# Patient Record
Sex: FEMALE | Race: WHITE | Employment: OTHER | URBAN - METROPOLITAN AREA
[De-identification: names, ages, dates, MRNs, and addresses within clinical notes are randomized per-mention and may not be internally consistent; named-entity substitution may affect disease eponyms.]

---

## 2022-03-10 ENCOUNTER — HOSPITAL ENCOUNTER (EMERGENCY)
Age: 87
Discharge: HOME | End: 2022-03-10
Attending: STUDENT IN AN ORGANIZED HEALTH CARE EDUCATION/TRAINING PROGRAM
Payer: MEDICARE

## 2022-03-10 VITALS
HEART RATE: 74 BPM | TEMPERATURE: 98.2 F | SYSTOLIC BLOOD PRESSURE: 142 MMHG | DIASTOLIC BLOOD PRESSURE: 86 MMHG | RESPIRATION RATE: 16 BRPM | OXYGEN SATURATION: 98 % | WEIGHT: 143.96 LBS

## 2022-03-10 DIAGNOSIS — R50.9 FEVER, UNSPECIFIED FEVER CAUSE: ICD-10-CM

## 2022-03-10 DIAGNOSIS — R35.0 URINARY FREQUENCY: Primary | ICD-10-CM

## 2022-03-10 LAB
APPEARANCE UR: CLEAR
BACTERIA URNS QL MICRO: NEGATIVE /HPF
BILIRUB UR QL: NEGATIVE
COLOR UR: ABNORMAL
EPITH CASTS URNS QL MICRO: ABNORMAL /LPF
GLUCOSE UR STRIP.AUTO-MCNC: NEGATIVE MG/DL
HGB UR QL STRIP: ABNORMAL
KETONES UR QL STRIP.AUTO: NEGATIVE MG/DL
LEUKOCYTE ESTERASE UR QL STRIP.AUTO: NEGATIVE
NITRITE UR QL STRIP.AUTO: NEGATIVE
PH UR STRIP: 6 [PH] (ref 5–8)
PROT UR STRIP-MCNC: NEGATIVE MG/DL
RBC #/AREA URNS HPF: ABNORMAL /HPF (ref 0–5)
SP GR UR REFRACTOMETRY: 1.02 (ref 1–1.03)
UR CULT HOLD, URHOLD: NORMAL
UROBILINOGEN UR QL STRIP.AUTO: 0.2 EU/DL (ref 0.2–1)
WBC URNS QL MICRO: ABNORMAL /HPF (ref 0–4)

## 2022-03-10 PROCEDURE — 81001 URINALYSIS AUTO W/SCOPE: CPT

## 2022-03-10 PROCEDURE — 99283 EMERGENCY DEPT VISIT LOW MDM: CPT

## 2022-03-10 PROCEDURE — 87086 URINE CULTURE/COLONY COUNT: CPT

## 2022-03-10 NOTE — DISCHARGE INSTRUCTIONS
Return if fever persists for more than 48 hours, trouble breathing, abdominal pain, or other concerning symptoms

## 2022-03-10 NOTE — ED PROVIDER NOTES
The history is provided by the patient and a relative. Urinary Frequency   This is a new problem. The current episode started 6 to 12 hours ago. The problem occurs every urination. The problem has not changed since onset. The quality of the pain is described as burning. The pain is mild. There has been a fever of 100 - 100.9 F. The fever has been present for less than 1 day. She is not sexually active. Associated symptoms include frequency and urgency. Pertinent negatives include no chills, no sweats, no nausea, no vomiting, no flank pain, no abdominal pain and no back pain. She has tried nothing for the symptoms. The treatment provided no relief. Her past medical history is significant for recurrent UTIs. No past medical history on file. No past surgical history on file. No family history on file. Social History     Socioeconomic History    Marital status: Not on file     Spouse name: Not on file    Number of children: Not on file    Years of education: Not on file    Highest education level: Not on file   Occupational History    Not on file   Tobacco Use    Smoking status: Not on file    Smokeless tobacco: Not on file   Substance and Sexual Activity    Alcohol use: Not on file    Drug use: Not on file    Sexual activity: Not on file   Other Topics Concern    Not on file   Social History Narrative    Not on file     Social Determinants of Health     Financial Resource Strain:     Difficulty of Paying Living Expenses: Not on file   Food Insecurity:     Worried About Running Out of Food in the Last Year: Not on file    Leilani of Food in the Last Year: Not on file   Transportation Needs:     Lack of Transportation (Medical): Not on file    Lack of Transportation (Non-Medical):  Not on file   Physical Activity:     Days of Exercise per Week: Not on file    Minutes of Exercise per Session: Not on file   Stress:     Feeling of Stress : Not on file   Social Connections:     Frequency of Communication with Friends and Family: Not on file    Frequency of Social Gatherings with Friends and Family: Not on file    Attends [employer] Services: Not on file    Active Member of Clubs or Organizations: Not on file    Attends Club or Organization Meetings: Not on file    Marital Status: Not on file   Intimate Partner Violence:     Fear of Current or Ex-Partner: Not on file    Emotionally Abused: Not on file    Physically Abused: Not on file    Sexually Abused: Not on file   Housing Stability:     Unable to Pay for Housing in the Last Year: Not on file    Number of [place] in the Last Year: Not on file    Unstable Housing in the Last Year: Not on file         ALLERGIES: Amoxicillin, Augmentin [amoxicillin-pot clavulanate], Avelox [moxifloxacin], Celebrex [celecoxib], Ciprofloxacin, Clindamycin, Penicillin g, Phenergan [promethazine], Pyridium [phenazopyridine], Sulfa (sulfonamide antibiotics), and Tramadol    Review of Systems   Constitutional: Positive for fever (at home, took Aleve this morning). Negative for chills. HENT: Negative for sore throat. Respiratory: Negative for cough. Gastrointestinal: Negative for abdominal pain, nausea and vomiting. Genitourinary: Positive for frequency and urgency. Negative for flank pain. See HPI     Musculoskeletal: Negative for back pain, neck pain and neck stiffness. Skin: Negative for color change. Neurological: Negative for headaches. All other systems reviewed and are negative. Vitals:    03/10/22 1431   BP: (!) 163/104   Pulse: 66   Resp: (!) 200   Temp: 98.2 °F (36.8 °C)   SpO2: 96%   Weight: 65.3 kg (143 lb 15.4 oz)            Physical Exam  Vitals and nursing note reviewed. Constitutional:       General: She is not in acute distress. Appearance: She is well-developed. HENT:      Head: Normocephalic and atraumatic.    Eyes:      Conjunctiva/sclera: Conjunctivae normal.   Cardiovascular:      Rate and Rhythm: Normal rate and regular rhythm. Pulmonary:      Effort: Pulmonary effort is normal. No respiratory distress. Abdominal:      Palpations: Abdomen is soft. Tenderness: There is no abdominal tenderness. There is no right CVA tenderness, left CVA tenderness or guarding. Musculoskeletal:         General: No deformity or signs of injury. Normal range of motion. Cervical back: Normal range of motion and neck supple. No rigidity. Skin:     General: Skin is warm and dry. Findings: No erythema. Neurological:      Mental Status: She is alert and oriented to person, place, and time. Motor: No abnormal muscle tone. MDM       Procedures    A/P: This is a 62-year-old female who recently moved here from [place] 2 weeks ago, attempting to establish a primary care physician and had an appointment scheduled for today, however, had a low-grade temperature at home therefore the plan to be canceled. She was told to come to the ED for further evaluation. Only complaint now is urinary frequency and dysuria. She is afebrile here and looks clinically well. No SIRS criteria. No other focal symptoms to suggest pneumonia, URI, meningitis, cellulitis, intra-abdominal surgical emergency. Plan to check UA to screen for UTI, will likely need empiric antibiotic treatment. 3:00 PM  Change of shift. Care of patient signed over to Dr. Radha Saleh. Handoff complete.

## 2022-03-10 NOTE — ED NOTES
3PM  Change of shift. Care of patient taken over from Dr Maryse Barahona; H&P reviewed, bedside handoff complete. Awaiting urinalysis    UA shows  Small amount of blood but no other signs of infection. Will send cultures and she is symptomatic with fever. At this time I do not feel that she needs treatment. She has no other localizing symptoms that would warrant testing. She is afebrile here with stable vital signs. I offered Covid test which she declined at this time. ICD-10-CM ICD-9-CM    1. Urinary frequency  R35.0 788.41    2.  Fever, unspecified fever cause  R50.9 780.60      DC  Dallas Hamilton, DO

## 2022-03-10 NOTE — ED NOTES
Patient given discharge papers and instructions by primary RN. Patient verbalized understanding and stated not having questions or concerns regarding her care. Patient ambulatory out of ED in no new acute distress.

## 2022-03-10 NOTE — ED TRIAGE NOTES
Patient arrives with c/o fever that started this morning at a PCP appointment she had and frequent urination. Patient was at PCP today and they wouldn't see her due to fever.

## 2022-03-11 LAB
BACTERIA SPEC CULT: NORMAL
SERVICE CMNT-IMP: NORMAL

## 2022-08-21 ENCOUNTER — HOSPITAL ENCOUNTER (EMERGENCY)
Age: 87
Discharge: HOME | End: 2022-08-21
Attending: EMERGENCY MEDICINE | Admitting: EMERGENCY MEDICINE
Payer: MEDICARE

## 2022-08-21 VITALS
DIASTOLIC BLOOD PRESSURE: 75 MMHG | OXYGEN SATURATION: 99 % | SYSTOLIC BLOOD PRESSURE: 133 MMHG | TEMPERATURE: 97.3 F | HEART RATE: 63 BPM | WEIGHT: 153 LBS | RESPIRATION RATE: 16 BRPM

## 2022-08-21 DIAGNOSIS — R82.998 URINARY CRYSTALS: ICD-10-CM

## 2022-08-21 DIAGNOSIS — R60.0 PEDAL EDEMA: Primary | ICD-10-CM

## 2022-08-21 LAB
ALBUMIN SERPL-MCNC: 3.5 G/DL (ref 3.5–5)
ALBUMIN/GLOB SERPL: 0.8 {RATIO} (ref 1.1–2.2)
ALP SERPL-CCNC: 119 U/L (ref 45–117)
ALT SERPL-CCNC: 33 U/L (ref 12–78)
ANION GAP SERPL CALC-SCNC: 8 MMOL/L (ref 5–15)
APPEARANCE UR: CLEAR
AST SERPL-CCNC: 34 U/L (ref 15–37)
BACTERIA URNS QL MICRO: NEGATIVE /HPF
BASOPHILS # BLD: 0 K/UL (ref 0–0.1)
BASOPHILS NFR BLD: 1 % (ref 0–1)
BILIRUB SERPL-MCNC: 0.5 MG/DL (ref 0.2–1)
BILIRUB UR QL: NEGATIVE
BUN SERPL-MCNC: 11 MG/DL (ref 6–20)
BUN/CREAT SERPL: 20 (ref 12–20)
CALCIUM SERPL-MCNC: 9 MG/DL (ref 8.5–10.1)
CHLORIDE SERPL-SCNC: 99 MMOL/L (ref 97–108)
CO2 SERPL-SCNC: 27 MMOL/L (ref 21–32)
COLOR UR: ABNORMAL
CREAT SERPL-MCNC: 0.56 MG/DL (ref 0.55–1.02)
DIFFERENTIAL METHOD BLD: NORMAL
EOSINOPHIL # BLD: 0.1 K/UL (ref 0–0.4)
EOSINOPHIL NFR BLD: 2 % (ref 0–7)
EPITH CASTS URNS QL MICRO: ABNORMAL /LPF
ERYTHROCYTE [DISTWIDTH] IN BLOOD BY AUTOMATED COUNT: 14 % (ref 11.5–14.5)
GLOBULIN SER CALC-MCNC: 4.3 G/DL (ref 2–4)
GLUCOSE SERPL-MCNC: 84 MG/DL (ref 65–100)
GLUCOSE UR STRIP.AUTO-MCNC: NEGATIVE MG/DL
HCT VFR BLD AUTO: 38.5 % (ref 35–47)
HGB BLD-MCNC: 12.5 G/DL (ref 11.5–16)
HGB UR QL STRIP: NEGATIVE
IMM GRANULOCYTES # BLD AUTO: 0 K/UL (ref 0–0.04)
IMM GRANULOCYTES NFR BLD AUTO: 0 % (ref 0–0.5)
KETONES UR QL STRIP.AUTO: NEGATIVE MG/DL
LEUKOCYTE ESTERASE UR QL STRIP.AUTO: ABNORMAL
LYMPHOCYTES # BLD: 1.7 K/UL (ref 0.8–3.5)
LYMPHOCYTES NFR BLD: 29 % (ref 12–49)
MCH RBC QN AUTO: 28.3 PG (ref 26–34)
MCHC RBC AUTO-ENTMCNC: 32.5 G/DL (ref 30–36.5)
MCV RBC AUTO: 87.3 FL (ref 80–99)
MONOCYTES # BLD: 0.4 K/UL (ref 0–1)
MONOCYTES NFR BLD: 7 % (ref 5–13)
NEUTS SEG # BLD: 3.6 K/UL (ref 1.8–8)
NEUTS SEG NFR BLD: 61 % (ref 32–75)
NITRITE UR QL STRIP.AUTO: NEGATIVE
NRBC # BLD: 0 K/UL (ref 0–0.01)
NRBC BLD-RTO: 0 PER 100 WBC
PH UR STRIP: 7 [PH] (ref 5–8)
PLATELET # BLD AUTO: 248 K/UL (ref 150–400)
PMV BLD AUTO: 9 FL (ref 8.9–12.9)
POTASSIUM SERPL-SCNC: 4.1 MMOL/L (ref 3.5–5.1)
PROT SERPL-MCNC: 7.8 G/DL (ref 6.4–8.2)
PROT UR STRIP-MCNC: NEGATIVE MG/DL
RBC # BLD AUTO: 4.41 M/UL (ref 3.8–5.2)
RBC #/AREA URNS HPF: ABNORMAL /HPF (ref 0–5)
SODIUM SERPL-SCNC: 134 MMOL/L (ref 136–145)
SP GR UR REFRACTOMETRY: 1.01 (ref 1–1.03)
TRI-PHOS CRY URNS QL MICRO: ABNORMAL
UR CULT HOLD, URHOLD: NORMAL
UROBILINOGEN UR QL STRIP.AUTO: 0.2 EU/DL (ref 0.2–1)
WBC # BLD AUTO: 5.9 K/UL (ref 3.6–11)
WBC URNS QL MICRO: ABNORMAL /HPF (ref 0–4)

## 2022-08-21 PROCEDURE — 85025 COMPLETE CBC W/AUTO DIFF WBC: CPT

## 2022-08-21 PROCEDURE — 99283 EMERGENCY DEPT VISIT LOW MDM: CPT | Performed by: EMERGENCY MEDICINE

## 2022-08-21 PROCEDURE — 81001 URINALYSIS AUTO W/SCOPE: CPT

## 2022-08-21 PROCEDURE — 80053 COMPREHEN METABOLIC PANEL: CPT

## 2022-08-21 PROCEDURE — 36415 COLL VENOUS BLD VENIPUNCTURE: CPT

## 2022-08-21 RX ORDER — FUROSEMIDE 20 MG/1
20 TABLET ORAL DAILY
Qty: 7 TABLET | Refills: 0 | Status: SHIPPED | OUTPATIENT
Start: 2022-08-21

## 2022-08-21 NOTE — ED PROVIDER NOTES
79-year-old female with history of breast cancer, colostomy presents to the emergency department chief complaint of urinary frequency with odor. This has been worsening over the past couple weeks. No dysuria. This is her typical presentation for urinary tract infection. She came this morning to the ER because she has some swelling in her bilateral feet and ankles which is new. Her feet and ankles are not painful. No new back pain. The history is provided by the patient and medical records. Urinary Odor   This is a new problem. The current episode started more than 1 week ago. The problem has not changed since onset. There has been no fever. Associated symptoms include frequency. Pertinent negatives include no nausea, no vomiting and no abdominal pain. Urinary Frequency   Associated symptoms include frequency. Pertinent negatives include no nausea, no vomiting and no abdominal pain. Past Medical History:   Diagnosis Date    Breast cancer ([address]     Colostomy present ([facility] [address]     Irregular heart beat 2020    watchman       Past Surgical History:   Procedure Laterality Date    HX BREAST LUMPECTOMY Right     HX ORTHOPAEDIC           History reviewed. No pertinent family history.     Social History     Socioeconomic History    Marital status:      Spouse name: Not on file    Number of children: Not on file    Years of education: Not on file    Highest education level: Not on file   Occupational History    Not on file   Tobacco Use    Smoking status: Never    Smokeless tobacco: Never   Substance and Sexual Activity    Alcohol use: Never    Drug use: Never    Sexual activity: Not on file   Other Topics Concern    Not on file   Social History Narrative    Not on file     Social Determinants of Health     Financial Resource Strain: Not on file   Food Insecurity: Not on file   Transportation Needs: Not on file   Physical Activity: Not on file   Stress: Not on file   Social Connections: Not on file Intimate Partner Violence: Not on file   Housing Stability: Not on file         ALLERGIES: Amoxicillin, Augmentin [amoxicillin-pot clavulanate], Avelox [moxifloxacin], Celebrex [celecoxib], Ciprofloxacin, Clindamycin, Penicillin g, Phenergan [promethazine], Pyridium [phenazopyridine], Sulfa (sulfonamide antibiotics), and Tramadol    Review of Systems   Constitutional:  Negative for fatigue and fever. HENT:  Negative for sneezing and sore throat. Respiratory:  Negative for cough and shortness of breath. Cardiovascular:  Positive for leg swelling. Negative for chest pain. Gastrointestinal:  Negative for abdominal pain, diarrhea, nausea and vomiting. Genitourinary:  Positive for frequency. Negative for difficulty urinating and dysuria. Musculoskeletal:  Negative for arthralgias and myalgias. Skin:  Negative for color change and rash. Neurological:  Negative for weakness and headaches. Psychiatric/Behavioral:  Negative for agitation and behavioral problems. Vitals:    08/21/22 1126   BP: 133/75   Pulse: 63   Resp: 16   Temp: 97.3 °F (36.3 °C)   SpO2: 99%   Weight: 69.4 kg (153 lb)            Physical Exam  Vitals and nursing note reviewed. Constitutional:       General: She is not in acute distress. Appearance: Normal appearance. She is well-developed. She is not ill-appearing, toxic-appearing or diaphoretic. HENT:      Head: Normocephalic and atraumatic. Nose: Nose normal.      Mouth/Throat:      Mouth: Mucous membranes are moist.      Pharynx: Oropharynx is clear. Eyes:      Extraocular Movements: Extraocular movements intact. Conjunctiva/sclera: Conjunctivae normal.      Pupils: Pupils are equal, round, and reactive to light. Cardiovascular:      Rate and Rhythm: Normal rate and regular rhythm. Pulses: Normal pulses. Heart sounds: Normal heart sounds. Pulmonary:      Effort: Pulmonary effort is normal. No respiratory distress.       Breath sounds: Normal breath sounds. No wheezing. Chest:      Chest wall: No tenderness. Abdominal:      General: Abdomen is flat. There is no distension. Palpations: Abdomen is soft. Tenderness: There is no abdominal tenderness. There is no guarding or rebound. Musculoskeletal:         General: No swelling, tenderness, deformity or signs of injury. Normal range of motion. Cervical back: Normal range of motion and neck supple. No rigidity. No muscular tenderness. Right lower leg: Edema present. Left lower leg: Edema present. Skin:     General: Skin is warm and dry. Capillary Refill: Capillary refill takes less than 2 seconds. Neurological:      General: No focal deficit present. Mental Status: She is alert and oriented to person, place, and time. Psychiatric:         Mood and Affect: Mood normal.         Behavior: Behavior normal.        MDM  Number of Diagnoses or Management Options  Diagnosis management comments: 56-year-old female presents as above with bilateral ankle and foot swelling along with a foul smell to her urine. Her urinalysis does not demonstrate evidence of infection but does show some crystals. She also had heavy salt intake yesterday which may account for swelling. Plan to give short course of diuretic with instructions to follow-up with [place] [facility], return if needed.        Amount and/or Complexity of Data Reviewed  Clinical lab tests: reviewed           Procedures

## 2023-02-21 ENCOUNTER — HOME HEALTH ADMISSION (OUTPATIENT)
Dept: HOME HEALTH SERVICES | Facility: HOME HEALTH | Age: 88
End: 2023-02-21
Payer: MEDICARE

## 2023-02-24 ENCOUNTER — HOME CARE VISIT (OUTPATIENT)
Dept: SCHEDULING | Facility: HOME HEALTH | Age: 88
End: 2023-02-24
Payer: MEDICARE

## 2023-02-24 VITALS
RESPIRATION RATE: 18 BRPM | HEART RATE: 66 BPM | OXYGEN SATURATION: 97 % | TEMPERATURE: 97.3 F | DIASTOLIC BLOOD PRESSURE: 70 MMHG | SYSTOLIC BLOOD PRESSURE: 125 MMHG

## 2023-02-24 PROCEDURE — G0151 HHCP-SERV OF PT,EA 15 MIN: HCPCS

## 2023-02-24 PROCEDURE — 400018 HH-NO PAY CLAIM PROCEDURE

## 2023-02-24 PROCEDURE — G0299 HHS/HOSPICE OF RN EA 15 MIN: HCPCS

## 2023-02-24 NOTE — HOME HEALTH
Skilled reason for admission/summary of clinical condition: CVA right weakness and aphasia admitted to home care for SN. Nursing needed for medication management, cp, neuro teaching and management, pain management. Diagnosis: cva right weakness and aphasia  Subjective: patient reports knee and hand pain  Caregiver: other. Caregiver assists with: ADL and Housekeeping Caregiver unable to assist with: Medications, Meals, Bathing, IADL, Wound care and Transportation. Caregiver is available 4 hours/day, 2 days/week Caregiver is present at this visit and did not participate with clinician. Medications reconciled and all medications are available in the home this visit. The following education was provided regarding medications: medication interactions and look alike medications: High alert medication teaching on eliquis, anticoagulant therapy education; purpose, dose, and frequency and signs and symptoms of abnormal bleeding such as unexplained bruising, dizziness/lightheadedness, red or tarry looking stool, blood in urine, blood in vomit High alert medication teaching on valcyclovir, Antibiotic therapy education Purpose, dose, and frequency and Side effects such as nausea, vomiting, diarrhea, fungal (yeast) vaginal infections or oral thrush. Patient/CG able to demonstrate knowledge through teach back with 75 percent accuracy. Medications are effective at this time. MD notified of any discrepancies/medication interactions patient reports was on a higher dose of neurontin prior to hosptial. A list of reconciled medications has been given to the patient/caregiver and a copy has been uploaded to media. Home health supplies by type and quantity ordered/delivered this visit include: zinc  Patient/caregiver instructed on plan of care and are agreeable to plan of care at this time.     Clinician reviewed orientation to home health booklet with patient/caregiver including agency phone number, agency complaint process, state hotline number, as well as joint commission's quality hotline number. Consent forms signed. Patient at risk for falls Yes:   Recommended requesting PT/OT orders due to fall risk YES:   Patient response to recommended requesting of PT/OT orders: agreeable    Discharge planning discussed with patient and caregiver. Discharge planning as follows: When goals are met Patient/caregiver did verbalize agreement with discharge planning. Clinical Assessment (What this means for the patient overall and need for ongoing skilled care):Patient will continue to benefit from skilled nursing services for medication management, cp, neuro teaching and management, pain management. Written Teaching Material Utilized: admission packet, med list    Specific plan for next visit: Educate on cva management    Plan of care and admission to home health status called to attending physician. The following practitioner has agreed to sign the ongoing Halina Walker MD, and was notified of the following: visit frequency of 1w9,3prn, requesting additional services as follows: pt/ot and medication concerns as follows: dose confirmation on neurontin    Interdisciplinary communication with:  Physician for the purpose of admission notification and reporting co eye sty to md    PCP: Nila Brown MD  Next scheduled doctor appointment: 4/10/23  Patient/Caregiver instructed to keep follow up appointment because lack of follow through with physician appointments could result in discontinuation of home care services for non-compliance. Patient/Caregiver verbalize knowledge of above through teach back with 100 percent accuracy. Emergency Preparedness: Patient/Caregiver instructed in the following:  Have one gallon of water per person for at least 3 days on hand. Have non-perishable food for at least 3 days that do not need to be cooked. Have flashlights and batteries.   Charge your cell phones and any back up lithium batteries for your cell phones. Have 3+ days of back up oxygen in your home. Have a phone in your home that is hard wired and does not require power. Have medication for a week in your home. Make sure you have a caregiver in the home to provide care in case your home health nurse cannot get to your house. Make sure you have all of your paperwork i.e. written emergency preparedness plan, Identification, insurance cards, DME phone number, physician and pharmacy phone number, agency phone number, and your medications in one place for easy access and in a zip lock bag to protect them. Take your Admission Handbook, written emergency preparedness plan, written medication list and folder if you relocate in the event of an emergency, if possible. Call agency if you relocate so we can contact you. Patient/Caregiver verbalize knowledge of above through teach back with 100 percent accuracy.

## 2023-02-25 NOTE — HOME HEALTH
Skilled reason for admission/summary of clinical condition: 83 yo female with CVA following Watchman repair 1/25/23; transferred to [facility] 2/02-2/23/23 then [facility] to home;  admitted to home care for SN/PT/OT; PMH significant for: a fib with ablation, HTN, HLD, chronic urinary incontinence, colon Ca, ostomy; right TKR; te TSR  Diagnosis: other sequelae of cerebral infarction  Subjective: pt states she wants to walk again; her cc is chronic left knee pain    Caregiver: son and paid cg during the day;. Caregiver assists with: IADLs, ADLs as needed;  Caregiver unable to assist with: n/a  Caregiver is available: MTWF 10-2; son available after work as needed; private cg Dannielle present today and did participate with PT    Medications reconciled and all medications are available in the home this visit. Meds reconciled by SN earlier today    Home health supplies by type and quantity ordered/delivered this visit include: n/a  Patient/caregiver instructed on plan of care and are agreeable to plan of care at this time. Clinician reviewed orientation to home health booklet with patient/caregiver including agency phone number, agency complaint process, state hotline number, as well as joint commission's quality hotline number. Computer signed. Patient at risk for falls  YES   Recommended requesting PT/OT orders due to fall risk  done   Patient response to recommended requesting of PT/OT orders:     Discharge planning discussed with patient and caregiver. Discharge planning as follows: pt to remain at home with assist of caregiver and guidance from MD; Patient/caregiver did verbalize agreement with discharge planning.      Clinical Assessment (What this means for the patient overall and need for ongoing skilled care): pt lives alone in small one bedroom second floor apt in senior apt house; her home is clean and free of clutter; there is an elevator to exit; she has a private paid cg MWF from 10-2 and states they will also add Tuesday; pt is confined to [facility] since CVA; she is ind in [facility] mobility within her apt; she reports she was \"working on walking in rehab\" with r. walker; today she was able to stand from [facility] and transfer ind to bed and toilet; she is ind in bed mobility; her cc is chronic left knee pain increased with wt bearing; her son has her walker and will bring it to her apt by next PT visit; she has a rollator but is unsafe to walk with it; PLOF the patient was ind in ambulation with rollator and ind in all ADLs; she has had the private cg for months to assist with IADLs; recommend PT 1w1,2w4 for ther ex, HEP and gait training; rehab potential is fair to achieve goals    Written Teaching Material Utilized: fall prevention strategies in admission handbook    Specific plan for next visit: initiate gait training with r. walker if equipment available    Plan of care and admission to home health status called to attending physician. The following practitioner has agreed to sign the ongoing POC Dr.Kathryn Lindo, and was notified of the following: PT: 1w1,2w4    Interdisciplinary communication with: note sent to admit SN re: wound    PCP: Glenn Contreras  Next scheduled doctor appointment: TBD  Patient/Caregiver instructed to keep follow up appointment because lack of follow through with physician appointments could result in discontinuation of home care services for non-compliance. Patient/Caregiver verbalize knowledge of above through teach back with 100 percent accuracy. Emergency Preparedness: Patient/Caregiver instructed in the following:  Have one gallon of water per person for at least 3 days on hand. Have non-perishable food for at least 3 days that do not need to be cooked. Have flashlights and batteries. Charge your cell phones and any back up lithium batteries for your cell phones. Have 3+ days of back up oxygen in your home. Have a phone in your home that is hard wired and does not require power.   Have medication for a week in your home. Make sure you have a caregiver in the home to provide care in case your home health nurse cannot get to your house. Make sure you have all of your paperwork i.e. written emergency preparedness plan, Identification, insurance cards, DME phone number, physician and pharmacy phone number, agency phone number, and your medications in one place for easy access and in a zip lock bag to protect them. Take your Admission Handbook, written emergency preparedness plan, written medication list and folder if you relocate in the event of an emergency, if possible. Call agency if you relocate so we can contact you. Patient/Caregiver verbalize knowledge of above through teach back with 50 percent accuracy.

## 2023-02-26 VITALS
TEMPERATURE: 97.9 F | RESPIRATION RATE: 16 BRPM | HEART RATE: 67 BPM | DIASTOLIC BLOOD PRESSURE: 72 MMHG | OXYGEN SATURATION: 100 % | SYSTOLIC BLOOD PRESSURE: 138 MMHG

## 2023-02-27 ENCOUNTER — HOME CARE VISIT (OUTPATIENT)
Dept: SCHEDULING | Facility: HOME HEALTH | Age: 88
End: 2023-02-27
Payer: MEDICARE

## 2023-02-27 PROCEDURE — G0152 HHCP-SERV OF OT,EA 15 MIN: HCPCS

## 2023-02-28 ENCOUNTER — HOME CARE VISIT (OUTPATIENT)
Dept: SCHEDULING | Facility: HOME HEALTH | Age: 88
End: 2023-02-28
Payer: MEDICARE

## 2023-02-28 VITALS
SYSTOLIC BLOOD PRESSURE: 100 MMHG | TEMPERATURE: 98.8 F | HEART RATE: 67 BPM | DIASTOLIC BLOOD PRESSURE: 60 MMHG | RESPIRATION RATE: 16 BRPM | OXYGEN SATURATION: 96 %

## 2023-02-28 VITALS
HEART RATE: 61 BPM | OXYGEN SATURATION: 97 % | RESPIRATION RATE: 16 BRPM | SYSTOLIC BLOOD PRESSURE: 130 MMHG | DIASTOLIC BLOOD PRESSURE: 70 MMHG | TEMPERATURE: 97.9 F

## 2023-02-28 PROCEDURE — G0151 HHCP-SERV OF PT,EA 15 MIN: HCPCS

## 2023-02-28 NOTE — HOME HEALTH
Skilled reason for admission/summary of clinical condition: 81 yo female s/p CVA following [employer] repair 1/25/23; transferred to [place] [address] 2/02-2/23/23 then [facility] to home;  admitted to home care for SN/PT/OT; PMH significant for: a fib with ablation, HTN, HLD, chronic urinary incontinence, colon Ca, ostomy; right TKR; te TSR. PLOF: lives in a [address] bulding. Had caregiver 5 days a week assisting with ADLs and IADLS. was walking short distances. Diagnosis: other sequelae of cerebral infarction    Subjective: pt states she wants to walk again; her cc is chronic left knee pain    Caregiver: son and paid cg during the day;. Caregiver assists with: IADLs, ADLs as needed;  Caregiver unable to assist with: n/a  Caregiver is available: [facility] 10-2; son available after work as needed; private cg Kathryn Stein present today and did participate with PT      Patient/caregiver instructed on plan of care and are agreeable to plan of care at this time. Patient at risk for falls  YES   Recommended requesting PT/OT orders due to fall risk  done   Patient response to recommended requesting of PT/OT orders: agreeable    Discharge planning discussed with patient and caregiver. Discharge planning as follows: pt to remain at home with assist of caregiver and guidance from MD; Patient/caregiver did verbalize agreement with discharge planning. Clinical Assessment (What this means for the patient overall and need for ongoing skilled care): pt lives alone in small one bedroom second floor apt in senior apt. Pt currenly using WC for all mobility in the home. Pt is I with WC mobility. Able to complete stand pivot transer to commode with CGA.   patient presents at high risk of falls (MAHC-10 = 9) and mod A for safe ADL completion (Modified Barthel Index = 50/100) during OT Eval. She is currently unsafe with transfesr to shower due to impaired balance and safety and unable to manage meal prep due to BUE weakness and slightly impaired ROM in the RUE. Skilled OT services are necessary to reduce patients fall risk and assist him in safely returning to his PLOF. Specific plan for next visit: initiate gait training with alfonzo walker if equipment availabilty. Plan of care and admission to home health status called to attending physician. The following practitioner has agreed to sign the ongoing POC Dr.Kathryn Lindo, and was notified of the following: OT 2w4    PCP: Eduardo Douglass    Next scheduled doctor appointment: TBD  Patient/Caregiver instructed to keep follow up appointment because lack of follow through with physician appointments could result in discontinuation of home care services for non-compliance. Patient/Caregiver verbalize knowledge of above through teach back with 100 percent accuracy. .  Modified Barthel:   Feeding  10  Bathing 0   Grooming 5  Dressing 0 5   Bowels 10  Bladder 0 5   Toilet Use 0 5   Transfer 0 5   Mobility 0   Stairs 0 5  50/100 mod A needed for ADLs.

## 2023-03-01 ENCOUNTER — HOME CARE VISIT (OUTPATIENT)
Dept: SCHEDULING | Facility: HOME HEALTH | Age: 88
End: 2023-03-01
Payer: MEDICARE

## 2023-03-01 NOTE — HOME HEALTH
Subjective: Patient states her groin area hurts and called out to urologist  Falls since last visit NO(if yes complete the Fall Tracking Form and include bsrifallreport):  Caregiver involvement changes: None  Home health supplies by type and quantity ordered/delivered this visit include: None    Clinician asked if patient has had any physician contact since last home care visit and patient states: NO  Clinician asked if patient has any new or changed medications and patient states:  NO   If Yes, were medications reconciled? N/A   Was the certifying physician notified of changes in medications? N/A     Clinical assessment (what this visit means for the patient overall and need for ongoing skilled care) and progress or lack of progress towards SPECIFIC goals: Patient ambulated with assistance today and demonstrates decr balance. Gait goal not met. Patient demonstrates limited carryover with education for safe transfers. Fall risk goal not met. Written Teaching Material Utilized: calendar of appointments    Interdisciplinary communication with: N/A     Discharge planning as follows: Will discharge when the patient has reached their maximum functional potential and maximum safety in their home    Specific plan for next visit: continue with gait and balance.

## 2023-03-02 ENCOUNTER — HOME CARE VISIT (OUTPATIENT)
Dept: SCHEDULING | Facility: HOME HEALTH | Age: 88
End: 2023-03-02
Payer: MEDICARE

## 2023-03-02 PROCEDURE — G0151 HHCP-SERV OF PT,EA 15 MIN: HCPCS

## 2023-03-03 ENCOUNTER — HOME CARE VISIT (OUTPATIENT)
Dept: SCHEDULING | Facility: HOME HEALTH | Age: 88
End: 2023-03-03
Payer: MEDICARE

## 2023-03-03 VITALS
OXYGEN SATURATION: 97 % | RESPIRATION RATE: 16 BRPM | TEMPERATURE: 98.1 F | HEART RATE: 70 BPM | DIASTOLIC BLOOD PRESSURE: 79 MMHG | SYSTOLIC BLOOD PRESSURE: 128 MMHG

## 2023-03-03 PROCEDURE — G0152 HHCP-SERV OF OT,EA 15 MIN: HCPCS

## 2023-03-03 NOTE — HOME HEALTH
Subjective: Patient states she had a bad night of sleep due to back pain. Falls since last visit NO(if yes complete the Fall Tracking Form and include bsrifallreport):  Caregiver involvement changes: None  Home health supplies by type and quantity ordered/delivered this visit include: None    Clinician asked if patient has had any physician contact since last home care visit and patient states: NO  Clinician asked if patient has any new or changed medications and patient states:  NO   If Yes, were medications reconciled? N/A   Was the certifying physician notified of changes in medications? N/A     Clinical assessment (what this visit means for the patient overall and need for ongoing skilled care) and progress or lack of progress towards SPECIFIC goals: Patient required education about bed position to ease pain while sleeping. She also benefitted from manual therapy techniques to thoracolumbar region to reduce pain. Continue to monitor symptoms and treat accordingly. Patient remains unsafe with RW and requires assistance for management. Gait goal not met. Written Teaching Material Utilized: N/A    Interdisciplinary communication with: Michele Wagner for the purpose of information hand off from therapy session    Discharge planning as follows:  Will discharge when the patient has reached their maximum functional potential and maximum safety in their home    Specific plan for next visit: continue with gait and monitor pain symptoms

## 2023-03-04 VITALS
DIASTOLIC BLOOD PRESSURE: 62 MMHG | SYSTOLIC BLOOD PRESSURE: 118 MMHG | TEMPERATURE: 97.8 F | HEART RATE: 94 BPM | RESPIRATION RATE: 18 BRPM | OXYGEN SATURATION: 98 %

## 2023-03-06 NOTE — HOME HEALTH
Subjective: Patient states: \" My aide did not come today. Her car broke down. \"    Falls since last visit NO(if yes complete the Fall Tracking Form and include bsrifallreport):    Caregiver involvement changes: None    Home health supplies by type and quantity ordered/delivered this visit include: None       Clinician asked if patient has had any physician contact since last home care visit and patient states: NO    Clinician asked if patient has any new or changed medications and patient states:  NO     If Yes, were medications reconciled? N/A     Was the certifying physician notified of changes in medications? N/A      Clinical assessment (what this visit means for the patient overall and need for ongoing skilled care) and progress or lack of progress towards SPECIFIC goals: Today instructed pt in shower safety; shower task; ADL setup; fall prevention; and home safety. Pt required SBA and VC' ing with shower transfer. Pt required SBA with shower task and vc'ing for ADL setup and safety. Pt able to dress self with modified iND. Pt using barrier cream on coccyx area due to risk for breakdown and area healing. see interventions for safety. Written Teaching Material Utilized: N/A     Interdisciplinary communication with: Oanh Baird PT for the purpose of POC/collaboration. Discharge planning as follows: Will discharge when the patient has reached their maximum functional potential with ADL/IADL goals and maximum safety in their home. Specific plan for next visit: ADL/IADL safety; pain management; fall prevention/home safety.

## 2023-03-07 ENCOUNTER — HOME CARE VISIT (OUTPATIENT)
Dept: SCHEDULING | Facility: HOME HEALTH | Age: 88
End: 2023-03-07
Payer: MEDICARE

## 2023-03-07 VITALS
DIASTOLIC BLOOD PRESSURE: 64 MMHG | TEMPERATURE: 98.2 F | SYSTOLIC BLOOD PRESSURE: 110 MMHG | OXYGEN SATURATION: 100 % | RESPIRATION RATE: 16 BRPM | HEART RATE: 78 BPM

## 2023-03-07 PROCEDURE — G0151 HHCP-SERV OF PT,EA 15 MIN: HCPCS

## 2023-03-08 ENCOUNTER — HOME CARE VISIT (OUTPATIENT)
Dept: SCHEDULING | Facility: HOME HEALTH | Age: 88
End: 2023-03-08
Payer: MEDICARE

## 2023-03-08 PROCEDURE — G0299 HHS/HOSPICE OF RN EA 15 MIN: HCPCS

## 2023-03-08 PROCEDURE — G0152 HHCP-SERV OF OT,EA 15 MIN: HCPCS

## 2023-03-08 NOTE — HOME HEALTH
Subjective: Patient states she is doing better today. Pain has improved this morning. Falls since last visit NO(if yes complete the Fall Tracking Form and include bsrifallreport):  Caregiver involvement changes: paid caregivers coming 3 days a week now  Home health supplies by type and quantity ordered/delivered this visit include: none    Clinician asked if patient has had any physician contact since last home care visit and patient states: NO  Clinician asked if patient has any new or changed medications and patient states:  NO   If Yes, were medications reconciled? N/A   Was the certifying physician notified of changes in medications? N/A     Clinical assessment (what this visit means for the patient overall and need for ongoing skilled care) and progress or lack of progress towards SPECIFIC goals: Patient demonstrated impaired ambulation today d/t pain in left knee with weight bearing. Gait goal not met. Fall risk remains high d/t inconsistency with wearing shoes during the day. Fall risk goal not met. Written Teaching Material Utilized: N/A    Interdisciplinary communication with: N/A  Discharge planning as follows: When goals are met    Specific plan for next visit: continue with gait and balance training.

## 2023-03-09 ENCOUNTER — HOME CARE VISIT (OUTPATIENT)
Dept: SCHEDULING | Facility: HOME HEALTH | Age: 88
End: 2023-03-09
Payer: MEDICARE

## 2023-03-09 VITALS
TEMPERATURE: 98.5 F | OXYGEN SATURATION: 98 % | HEART RATE: 70 BPM | SYSTOLIC BLOOD PRESSURE: 130 MMHG | DIASTOLIC BLOOD PRESSURE: 78 MMHG | RESPIRATION RATE: 16 BRPM

## 2023-03-09 VITALS
TEMPERATURE: 97.8 F | SYSTOLIC BLOOD PRESSURE: 118 MMHG | OXYGEN SATURATION: 98 % | DIASTOLIC BLOOD PRESSURE: 62 MMHG | HEART RATE: 71 BPM | RESPIRATION RATE: 18 BRPM

## 2023-03-09 PROCEDURE — G0151 HHCP-SERV OF PT,EA 15 MIN: HCPCS

## 2023-03-10 ENCOUNTER — HOME CARE VISIT (OUTPATIENT)
Dept: SCHEDULING | Facility: HOME HEALTH | Age: 88
End: 2023-03-10
Payer: MEDICARE

## 2023-03-10 VITALS
RESPIRATION RATE: 18 BRPM | SYSTOLIC BLOOD PRESSURE: 132 MMHG | TEMPERATURE: 98.2 F | DIASTOLIC BLOOD PRESSURE: 65 MMHG | HEART RATE: 72 BPM | OXYGEN SATURATION: 98 %

## 2023-03-10 VITALS
DIASTOLIC BLOOD PRESSURE: 70 MMHG | TEMPERATURE: 97.2 F | RESPIRATION RATE: 18 BRPM | OXYGEN SATURATION: 96 % | SYSTOLIC BLOOD PRESSURE: 122 MMHG | HEART RATE: 86 BPM

## 2023-03-10 PROCEDURE — G0152 HHCP-SERV OF OT,EA 15 MIN: HCPCS

## 2023-03-10 NOTE — HOME HEALTH
Subjective: Patient meets with wheelchair rep to discuss options for wheelchair and wheelchair cushion  Falls since last visit NO(if yes complete the Fall Tracking Form and include bsrifallreport):  Caregiver involvement changes: NONE  Home health supplies by type and quantity ordered/delivered this visit include: None    Clinician asked if patient has had any physician contact since last home care visit and patient states: NO  Clinician asked if patient has any new or changed medications and patient states:  NO   If Yes, were medications reconciled? N/A   Was the certifying physician notified of changes in medications? N/A     Clinical assessment (what this visit means for the patient overall and need for ongoing skilled care) and progress or lack of progress towards SPECIFIC goals: Patient remains a fall risk and wheelchair assessment complete. Continue to assess for improvements. Patient not compliant with pressure relief. Goal not met. Written Teaching Material Utilized: N/A    Interdisciplinary communication with: eHrb Baird and Severo Miguel, wheelchair rep, and Dr. Corine Messer for the purpose of POC collaboration and wheelchair mobility    Discharge planning as follows:  Will discharge when the patient has reached their maximum functional potential and maximum safety in their home    Specific plan for next visit: gait training and mobility

## 2023-03-11 NOTE — HOME HEALTH
Subjective: Patient states: \" I need to organize my pantry. I can't reach what I need from my w/c.\"     Falls since last visit NO(if yes complete the Fall Tracking Form and include bsrifallreport):    Caregiver involvement changes: None     Home health supplies by type and quantity ordered/delivered this visit include: None     Clinician asked if patient has had any physician contact since last home care visit and patient states: NO     Clinician asked if patient has any new or changed medications and patient states:  NO      If Yes, were medications reconciled? N/A      Was the certifying physician notified of changes in medications? N/A      Clinical assessment (what this visit means for the patient overall and need for ongoing skilled care) and progress or lack of progress towards SPECIFIC goals: Today assisted pt in organzinig pantry to for access to food items. Instructed in kitchen safety and setup for light meal prep. Pt has aides who come M-F for few hours in am to assist. Pt was able to ambulate with RW down [address] in apt builing. Pt desires to increase activity tolerance due to being very social and activities are on first floor of building and pt unable to self-propell w/c that distance at this time. PT is working on w/c eval for more appropriate w/c. Continue to instruct in shower safety; ADL setup; ADL setup; fall prevention; and home safety. Pt requires SBA and VC' ing with shower transfer. Pt required SBA with shower task and vc'ing for ADL setup and safety. Pt able to dress self with modified iND. Pt using barrier cream on coccyx area due to risk for breakdown and area healing. see interventions for safety. Written Teaching Material Utilized: N/A      Interdisciplinary communication with: Isrrael Pink PT for the purpose of POC/collaboration. Discharge planning as follows:  Will discharge when the patient has reached their maximum functional potential with ADL/IADL goals and maximum safety in their home. Specific plan for next visit: ADL/IADL safety; pain management; fall prevention/home safety.

## 2023-03-13 ENCOUNTER — HOME CARE VISIT (OUTPATIENT)
Dept: SCHEDULING | Facility: HOME HEALTH | Age: 88
End: 2023-03-13
Payer: MEDICARE

## 2023-03-13 PROCEDURE — G0152 HHCP-SERV OF OT,EA 15 MIN: HCPCS

## 2023-03-13 NOTE — HOME HEALTH
Subjective: Patient states: \" I am improving. \" \" I am glad to be home. \"         Falls since last visit NO(if yes complete the Fall Tracking Form and include bsrifallreport):         Caregiver involvement changes: None          Home health supplies by type and quantity ordered/delivered this visit include: None          Clinician asked if patient has had any physician contact since last home care visit and patient states: NO          Clinician asked if patient has any new or changed medications and patient states:  NO           If Yes, were medications reconciled? N/A           Was the certifying physician notified of changes in medications? N/A           Clinical assessment (what this visit means for the patient overall and need for ongoing skilled care) and progress or lack of progress towards SPECIFIC goals: Today pt continues to progress with ADL's. Shower transfers are improving. Pt able to perform bathing and dressing with mod I. Pt requires CGA-SBA for assistance with entering and exiting shower for transfer safety. Continue to instruct in w/c setup; safety with transfers. Today trialed different back support cushion for w/c and loosened w/c back to improve comfort in w/c. Discussed options for counter to microwave due to having to stand to access microwave currently. Counter top microwave wound improved safety. Continue to instruct in standing tolerance; balance activities; and UE HEP to promote improved IADL's. Pt was able to ambulate with RW down [address] in apt building today. Pt doesn't feel comfortable ambulating with aides and desires to ambulate more. Pt desires to increase activity tolerance due to being very social and activities are on first floor of building and pt unable to self-propel w/c that distance at this time. PT has completed w/c eval for more appropriate w/c. Continue to instruct in shower safety; ADL setup; IADL setup; fall prevention; and home safety.  Pt requires SBA and VC' ing with shower transfer. Pt required SBA with shower task and vc'ing for ADL setup and safety. Pt using barrier cream on coccyx area due to risk for breakdown and area healing with no new concerns today. see interventions for safety. Written Teaching Material Utilized: N/A           Interdisciplinary communication with: Cherie Wagner PT for the purpose of POC/collaboration. Discharge planning as follows: Will discharge when the patient has reached their maximum functional potential with ADL/IADL goals and maximum safety in their home. Specific plan for next visit: ADL/IADL safety; pain management; fall prevention/home safety.

## 2023-03-14 ENCOUNTER — HOME CARE VISIT (OUTPATIENT)
Dept: SCHEDULING | Facility: HOME HEALTH | Age: 88
End: 2023-03-14
Payer: MEDICARE

## 2023-03-14 ENCOUNTER — HOME CARE VISIT (OUTPATIENT)
Dept: HOME HEALTH SERVICES | Facility: HOME HEALTH | Age: 88
End: 2023-03-14
Payer: MEDICARE

## 2023-03-14 VITALS
OXYGEN SATURATION: 98 % | SYSTOLIC BLOOD PRESSURE: 112 MMHG | RESPIRATION RATE: 18 BRPM | HEART RATE: 60 BPM | DIASTOLIC BLOOD PRESSURE: 60 MMHG | TEMPERATURE: 97.8 F

## 2023-03-14 VITALS
HEART RATE: 77 BPM | OXYGEN SATURATION: 99 % | DIASTOLIC BLOOD PRESSURE: 88 MMHG | RESPIRATION RATE: 16 BRPM | SYSTOLIC BLOOD PRESSURE: 140 MMHG | TEMPERATURE: 98.5 F

## 2023-03-14 PROCEDURE — G0151 HHCP-SERV OF PT,EA 15 MIN: HCPCS

## 2023-03-15 ENCOUNTER — HOME CARE VISIT (OUTPATIENT)
Dept: SCHEDULING | Facility: HOME HEALTH | Age: 88
End: 2023-03-15
Payer: MEDICARE

## 2023-03-15 VITALS
SYSTOLIC BLOOD PRESSURE: 132 MMHG | OXYGEN SATURATION: 98 % | RESPIRATION RATE: 18 BRPM | HEART RATE: 78 BPM | TEMPERATURE: 97.8 F | DIASTOLIC BLOOD PRESSURE: 82 MMHG

## 2023-03-15 PROCEDURE — G0299 HHS/HOSPICE OF RN EA 15 MIN: HCPCS

## 2023-03-15 NOTE — HOME HEALTH
Subjective: Patient states: \" My urine is burning. I may have a infection. \"      Falls since last visit NO(if yes complete the Fall Tracking Form and include bsrifallreport):     Caregiver involvement changes: None        Home health supplies by type and quantity ordered/delivered this visit include: None      Clinician asked if patient has had any physician contact since last home care visit and patient states: NO      Clinician asked if patient has any new or changed medications and patient states:  NO         If Yes, were medications reconciled? N/A         Was the certifying physician notified of changes in medications? N/A         Clinical assessment (what this visit means for the patient overall and need for ongoing skilled care) and progress or lack of progress towards SPECIFIC goals: Today called [facility] to come to check urine for possible UTI due to having symptoms of frequency; burning; and foul odor. [facility] will be out this afternoon to check urine. Discussed options for raised commode seat options due 3-1 commode limits accessibility for w/c to reach commode for to empty ostomy bag. Informed pt of options for raised commode seat with support handles that bolt to commode seat opposed to over commode. Continue to instruct in w/c setup; safety with transfers. Continue to instruct in standing tolerance; balance activities for laundry task due to stackable washer/dryer for increased IADL's. Pt was able to ambulate with RW down [address] and back in apt building today. Pt desires to increase activity tolerance due to being very social and activities are on first floor of building and pt unable to self-propel w/c that distance at this time. Continue to instruct in shower safety; ADL setup; IADL setup; fall prevention; and home safety. Pt requires SBA and VC' ing with shower transfer. Pt required SBA with shower task and vc'ing for ADL setup and safety.  Pt using barrier cream on coccyx area due to risk for breakdown and area healing with no new concerns today. see interventions for safety. Written Teaching Material Utilized: N/A      Interdisciplinary communication with: Bertrand Galvan PT for the purpose of POC/collaboration. Discharge planning as follows: Will discharge when the patient has reached their maximum functional potential with ADL/IADL goals and maximum safety in their home. Specific plan for next visit: ADL/IADL safety; pain management; fall prevention/home safety.

## 2023-03-15 NOTE — HOME HEALTH
Subjective: Patient states she is so tired today but knows she should work hard to regain strength. Falls since last visit NO(if yes complete the Fall Tracking Form and include bsrifallreport):  Caregiver involvement changes: None  Home health supplies by type and quantity ordered/delivered this visit include: None    Clinician asked if patient has had any physician contact since last home care visit and patient states: NO; [facility] came out to assess sore area on labia and to assess urine for potential UTI. Clinician asked if patient has any new or changed medications and patient states:  NO   If Yes, were medications reconciled? N/A   Was the certifying physician notified of changes in medications? N/A     Clinical assessment (what this visit means for the patient overall and need for ongoing skilled care) and progress or lack of progress towards SPECIFIC goals: Patient demonstrates impaired balance and abnormal findings with VOR testing. Will treat for balance issues at next session. Patient limited with gait today d/t fatigue. Gait goal not met. Written Teaching Material Utilized: HEP for VOR exercises    Interdisciplinary communication with: Dr. Oc Eldridge Physician for the purpose of updated orders to treat balance impairments    Discharge planning as follows:  Will discharge when the patient has reached their maximum functional potential and maximum safety in their home    Specific plan for next visit: balance training

## 2023-03-16 ENCOUNTER — HOME CARE VISIT (OUTPATIENT)
Dept: SCHEDULING | Facility: HOME HEALTH | Age: 88
End: 2023-03-16
Payer: MEDICARE

## 2023-03-16 PROCEDURE — G0151 HHCP-SERV OF PT,EA 15 MIN: HCPCS

## 2023-03-16 PROCEDURE — G0152 HHCP-SERV OF OT,EA 15 MIN: HCPCS

## 2023-03-16 NOTE — HOME HEALTH
Subjective: Patient states she still is having pain in her vagina  Falls since last visit NO(if yes complete the Fall Tracking Form and include bsrifallreport):   Caregiver involvement changes: N/A  Home health supplies by type and quantity ordered/delivered this visit include: All supplies in the home    Clinician asked if patient has had any physician contact since last home care visit and patient states: NO  Clinician asked if patient has any new or changed medications and patient states:  NO   If Yes, were medications reconciled? N/A   Was the certifying physician notified of changes in medications? N/A     Clinical assessment (what this visit means for the patient overall and need for ongoing skilled care) and progress or lack of progress towards SPECIFIC goals: Patients wound is healed. Complaints again about vaginal pain. Not red/inflamed. Reinstructed patient on use of Estrodil cream. patient states she doesn't like using it, or inserting it. Explained clean technique and reason to use applicator. Insturcted to use after shower instead of prior. Patient wasn't sure how many times to use it and hasn't been. Has been using vaseline. Patient also instructed notification reasons, and CVA s/s. Still needs instruction. Encouraged patient to use barrier cream twice a day to help with possible skin irratation from incontinence. At risk for falls, skin breakdown. Interdisciplinary communication with: N/A  Discharge planning as follows:  Will discharge when the patient has reached their maximum functional potential and maximum safety in their home

## 2023-03-17 VITALS
DIASTOLIC BLOOD PRESSURE: 64 MMHG | RESPIRATION RATE: 18 BRPM | TEMPERATURE: 97.5 F | SYSTOLIC BLOOD PRESSURE: 118 MMHG | OXYGEN SATURATION: 97 % | HEART RATE: 73 BPM

## 2023-03-17 VITALS
OXYGEN SATURATION: 100 % | TEMPERATURE: 98.5 F | DIASTOLIC BLOOD PRESSURE: 69 MMHG | RESPIRATION RATE: 16 BRPM | HEART RATE: 76 BPM | SYSTOLIC BLOOD PRESSURE: 109 MMHG

## 2023-03-17 NOTE — HOME HEALTH
Subjective: Patient states she just woke up from a nap and is ready to work. Falls since last visit NO(if yes complete the Fall Tracking Form and include bsrifallreport):  Caregiver involvement changes: None  Home health supplies by type and quantity ordered/delivered this visit include: None    Clinician asked if patient has had any physician contact since last home care visit and patient states: NO  Clinician asked if patient has any new or changed medications and patient states:  NO   If Yes, were medications reconciled? N/A   Was the certifying physician notified of changes in medications? N/A     Clinical assessment (what this visit means for the patient overall and need for ongoing skilled care) and progress or lack of progress towards SPECIFIC goals: Patient demonstrates impaired safety with ambulation. Gait goal not met. Patient continues to require education on balance exercises. Balance goal not met. Written Teaching Material Utilized: HEP and schedule    Interdisciplinary communication with: Gokul Hemphill for wheelchair information    Discharge planning as follows:  When goals are met    Specific plan for next visit: gait and balance training

## 2023-03-17 NOTE — HOME HEALTH
Subjective: Patient states: \" My urine is burning. I may have a infection. \"         Falls since last visit NO(if yes complete the Fall Tracking Form and include bsrifallreport):      Caregiver involvement changes: None        Home health supplies by type and quantity ordered/delivered this visit include: None         Clinician asked if patient has had any physician contact since last home care visit and patient states: NO           Clinician asked if patient has any new or changed medications and patient states:  NO           If Yes, were medications reconciled? N/A            Was the certifying physician notified of changes in medications? N/A         Clinical assessment (what this visit means for the patient overall and need for ongoing skilled care) and progress or lack of progress towards SPECIFIC goals: Today pt reports urine small trace amounts bacteria and and [facility] was going culutre urine. Pt has been drinking more water and less symptoms of UTI. Continue to instruct in w/c setup; safety with transfers. Continue to instruct in standing tolerance; balance activities for laundry task due to stackable washer/dryer for increased IADL's. Pt was able to ambulate with RW down sánchez and back in apt building today. Pt desires to increase activity tolerance and request walking due to not able to walk unless with  OT or PT. Pt has c/g and working to train one pt maybe comfortable with due to pt desires to ambulate but needs assistance. AIdes have been inconsistent. Pt working to find a consistent person to help. Continue to instruct in shower safety; ADL setup; IADL setup; fall prevention; and home safety. Pt has issue with ostomy bag resulting in leakage but able to clean up and change bag. Pt requires SBA and VC' ing with shower transfer. Pt required SBA with shower task and vc'ing for ADL setup and safety.  Pt using barrier cream on coccyx area due to risk for breakdown and area healing with no new concerns today. Instructed in UE gentle ROM HEP due to having L shoulder pain from pushing w/c. see interventions for safety. Written Teaching Material Utilized: N/A       Interdisciplinary communication with: Nicky Herron PT for the purpose of POC/collaboration. Discharge planning as follows: Will discharge when the patient has reached their maximum functional potential with ADL/IADL goals and maximum safety in their home. Specific plan for next visit: ADL/IADL safety; pain management; fall prevention/home safety.

## 2023-03-20 ENCOUNTER — HOSPITAL ENCOUNTER (EMERGENCY)
Age: 88
Discharge: HOME | End: 2023-03-20
Attending: STUDENT IN AN ORGANIZED HEALTH CARE EDUCATION/TRAINING PROGRAM
Payer: MEDICARE

## 2023-03-20 ENCOUNTER — HOME CARE VISIT (OUTPATIENT)
Dept: SCHEDULING | Facility: HOME HEALTH | Age: 88
End: 2023-03-20
Payer: MEDICARE

## 2023-03-20 ENCOUNTER — APPOINTMENT (OUTPATIENT)
Dept: GENERAL RADIOLOGY | Age: 88
End: 2023-03-20
Attending: STUDENT IN AN ORGANIZED HEALTH CARE EDUCATION/TRAINING PROGRAM
Payer: MEDICARE

## 2023-03-20 VITALS
DIASTOLIC BLOOD PRESSURE: 64 MMHG | RESPIRATION RATE: 16 BRPM | HEART RATE: 88 BPM | SYSTOLIC BLOOD PRESSURE: 85 MMHG | TEMPERATURE: 98.1 F | OXYGEN SATURATION: 98 %

## 2023-03-20 VITALS
SYSTOLIC BLOOD PRESSURE: 142 MMHG | TEMPERATURE: 97.9 F | HEART RATE: 94 BPM | BODY MASS INDEX: 22.73 KG/M2 | HEIGHT: 68 IN | WEIGHT: 150 LBS | RESPIRATION RATE: 17 BRPM | OXYGEN SATURATION: 98 % | DIASTOLIC BLOOD PRESSURE: 93 MMHG

## 2023-03-20 DIAGNOSIS — E86.0 DEHYDRATION: Primary | ICD-10-CM

## 2023-03-20 DIAGNOSIS — I48.91 ATRIAL FIBRILLATION WITH RAPID VENTRICULAR RESPONSE (HCC): ICD-10-CM

## 2023-03-20 LAB
ALBUMIN SERPL-MCNC: 3.5 G/DL (ref 3.5–5)
ALBUMIN/GLOB SERPL: 1 (ref 1.1–2.2)
ALP SERPL-CCNC: 118 U/L (ref 45–117)
ALT SERPL-CCNC: 28 U/L (ref 12–78)
ANION GAP SERPL CALC-SCNC: 11 MMOL/L (ref 5–15)
APPEARANCE UR: CLEAR
AST SERPL-CCNC: 24 U/L (ref 15–37)
BACTERIA URNS QL MICRO: NEGATIVE /HPF
BASOPHILS # BLD: 0 K/UL (ref 0–0.1)
BASOPHILS NFR BLD: 0 % (ref 0–1)
BILIRUB SERPL-MCNC: 0.4 MG/DL (ref 0.2–1)
BILIRUB UR QL: NEGATIVE
BUN SERPL-MCNC: 18 MG/DL (ref 6–20)
BUN/CREAT SERPL: 25 (ref 12–20)
CALCIUM SERPL-MCNC: 8.8 MG/DL (ref 8.5–10.1)
CHLORIDE SERPL-SCNC: 104 MMOL/L (ref 97–108)
CO2 SERPL-SCNC: 24 MMOL/L (ref 21–32)
COLOR UR: ABNORMAL
CREAT SERPL-MCNC: 0.71 MG/DL (ref 0.55–1.02)
DIFFERENTIAL METHOD BLD: NORMAL
EOSINOPHIL # BLD: 0 K/UL (ref 0–0.4)
EOSINOPHIL NFR BLD: 1 % (ref 0–7)
EPITH CASTS URNS QL MICRO: ABNORMAL /LPF
ERYTHROCYTE [DISTWIDTH] IN BLOOD BY AUTOMATED COUNT: 14.5 % (ref 11.5–14.5)
GLOBULIN SER CALC-MCNC: 3.5 G/DL (ref 2–4)
GLUCOSE SERPL-MCNC: 148 MG/DL (ref 65–100)
GLUCOSE UR STRIP.AUTO-MCNC: NEGATIVE MG/DL
HCT VFR BLD AUTO: 40.5 % (ref 35–47)
HGB BLD-MCNC: 13.3 G/DL (ref 11.5–16)
HGB UR QL STRIP: ABNORMAL
IMM GRANULOCYTES # BLD AUTO: 0 K/UL (ref 0–0.04)
IMM GRANULOCYTES NFR BLD AUTO: 0 % (ref 0–0.5)
KETONES UR QL STRIP.AUTO: NEGATIVE MG/DL
LEUKOCYTE ESTERASE UR QL STRIP.AUTO: NEGATIVE
LYMPHOCYTES # BLD: 2.2 K/UL (ref 0.8–3.5)
LYMPHOCYTES NFR BLD: 35 % (ref 12–49)
MAGNESIUM SERPL-MCNC: 1.8 MG/DL (ref 1.6–2.4)
MCH RBC QN AUTO: 31 PG (ref 26–34)
MCHC RBC AUTO-ENTMCNC: 32.8 G/DL (ref 30–36.5)
MCV RBC AUTO: 94.4 FL (ref 80–99)
MONOCYTES # BLD: 0.5 K/UL (ref 0–1)
MONOCYTES NFR BLD: 8 % (ref 5–13)
MUCOUS THREADS URNS QL MICRO: ABNORMAL /LPF
NEUTS SEG # BLD: 3.5 K/UL (ref 1.8–8)
NEUTS SEG NFR BLD: 56 % (ref 32–75)
NITRITE UR QL STRIP.AUTO: NEGATIVE
NRBC # BLD: 0 K/UL (ref 0–0.01)
NRBC BLD-RTO: 0 PER 100 WBC
PH UR STRIP: 6 (ref 5–8)
PLATELET # BLD AUTO: 189 K/UL (ref 150–400)
PMV BLD AUTO: 9.4 FL (ref 8.9–12.9)
POTASSIUM SERPL-SCNC: 4.1 MMOL/L (ref 3.5–5.1)
PROT SERPL-MCNC: 7 G/DL (ref 6.4–8.2)
PROT UR STRIP-MCNC: NEGATIVE MG/DL
RBC # BLD AUTO: 4.29 M/UL (ref 3.8–5.2)
RBC #/AREA URNS HPF: ABNORMAL /HPF (ref 0–5)
SODIUM SERPL-SCNC: 139 MMOL/L (ref 136–145)
SP GR UR REFRACTOMETRY: 1.02 (ref 1–1.03)
UR CULT HOLD, URHOLD: NORMAL
UROBILINOGEN UR QL STRIP.AUTO: 0.2 EU/DL (ref 0.2–1)
WBC # BLD AUTO: 6.3 K/UL (ref 3.6–11)
WBC URNS QL MICRO: ABNORMAL /HPF (ref 0–4)

## 2023-03-20 PROCEDURE — 80053 COMPREHEN METABOLIC PANEL: CPT

## 2023-03-20 PROCEDURE — 36415 COLL VENOUS BLD VENIPUNCTURE: CPT

## 2023-03-20 PROCEDURE — 81001 URINALYSIS AUTO W/SCOPE: CPT

## 2023-03-20 PROCEDURE — 71045 X-RAY EXAM CHEST 1 VIEW: CPT

## 2023-03-20 PROCEDURE — 99285 EMERGENCY DEPT VISIT HI MDM: CPT

## 2023-03-20 PROCEDURE — 74011250636 HC RX REV CODE- 250/636: Performed by: STUDENT IN AN ORGANIZED HEALTH CARE EDUCATION/TRAINING PROGRAM

## 2023-03-20 PROCEDURE — 74011250637 HC RX REV CODE- 250/637: Performed by: STUDENT IN AN ORGANIZED HEALTH CARE EDUCATION/TRAINING PROGRAM

## 2023-03-20 PROCEDURE — G0151 HHCP-SERV OF PT,EA 15 MIN: HCPCS

## 2023-03-20 PROCEDURE — 74011000250 HC RX REV CODE- 250: Performed by: STUDENT IN AN ORGANIZED HEALTH CARE EDUCATION/TRAINING PROGRAM

## 2023-03-20 PROCEDURE — 96374 THER/PROPH/DIAG INJ IV PUSH: CPT

## 2023-03-20 PROCEDURE — 83735 ASSAY OF MAGNESIUM: CPT

## 2023-03-20 PROCEDURE — 93005 ELECTROCARDIOGRAM TRACING: CPT

## 2023-03-20 PROCEDURE — 96361 HYDRATE IV INFUSION ADD-ON: CPT

## 2023-03-20 PROCEDURE — 85025 COMPLETE CBC W/AUTO DIFF WBC: CPT

## 2023-03-20 RX ORDER — METOPROLOL TARTRATE 25 MG/1
12.5 TABLET, FILM COATED ORAL EVERY 12 HOURS
Status: DISCONTINUED | OUTPATIENT
Start: 2023-03-20 | End: 2023-03-20

## 2023-03-20 RX ORDER — METOPROLOL TARTRATE 5 MG/5ML
2.5 INJECTION INTRAVENOUS
Status: COMPLETED | OUTPATIENT
Start: 2023-03-20 | End: 2023-03-20

## 2023-03-20 RX ORDER — METOPROLOL TARTRATE 25 MG/1
25 TABLET, FILM COATED ORAL 2 TIMES DAILY
Qty: 60 TABLET | Refills: 0 | Status: SHIPPED | OUTPATIENT
Start: 2023-03-20 | End: 2023-04-19

## 2023-03-20 RX ORDER — METOPROLOL TARTRATE 25 MG/1
12.5 TABLET, FILM COATED ORAL
Status: COMPLETED | OUTPATIENT
Start: 2023-03-20 | End: 2023-03-20

## 2023-03-20 RX ADMIN — METOPROLOL TARTRATE 12.5 MG: 25 TABLET, FILM COATED ORAL at 18:00

## 2023-03-20 RX ADMIN — METOPROLOL TARTRATE 2.5 MG: 1 INJECTION, SOLUTION INTRAVENOUS at 16:53

## 2023-03-20 RX ADMIN — SODIUM CHLORIDE 1000 ML: 9 INJECTION, SOLUTION INTRAVENOUS at 15:16

## 2023-03-20 NOTE — ED TRIAGE NOTES
82year old female pt comes to the ED via squad for a CC Dehydration and A-fib. Occupational health was with patient today that saw she was having decreased urine output and felt she was dehydrated but patient has no complaints. EMS showed there EKG that she was in a fib. Pt is A&Ox4.

## 2023-03-20 NOTE — Clinical Note
Michele Lutz and Sadie,    Just looking at this week and the re-evaluation for Ms. Carter. Are you all planning on keeping her on? She is concerned that you will be discharging her. Thanks!     Carl Nance

## 2023-03-20 NOTE — HOME HEALTH
Subjective: Patient states she felt her heart was intermittently racing throughout the weekend. Monitored HR and it went up to 124. Falls since last visit NO(if yes complete the Fall Tracking Form and include bsrifallreport):  Caregiver involvement changes: None  Home health supplies by type and quantity ordered/delivered this visit include: None    Clinician asked if patient has had any physician contact since last home care visit and patient states: NO  Clinician asked if patient has any new or changed medications and patient states:  NO   If Yes, were medications reconciled? N/A   Was the certifying physician notified of changes in medications? N/A     Clinical assessment (what this visit means for the patient overall and need for ongoing skilled care) and progress or lack of progress towards SPECIFIC goals: Patient not appropriate for additional PT today d/t unstable vitals. Patient was sent to ED by EMS for evaluation of low BP and irregular heart rate. Written Teaching Material Utilized: MIN    Interdisciplinary communication with: Jordan Maynard and Dr. Tamara Javed Physician for the purpose of vital sign concerns    Discharge planning as follows:  Will discharge when the patient has reached their maximum functional potential and maximum safety in their home    Specific plan for next visit: continue with gait and balance

## 2023-03-20 NOTE — DISCHARGE INSTRUCTIONS
You were in atrial fibrillation with a rapid rate today. If you are not taking your metoprolol please resume that medication.  Follow-up with your cardiologist.

## 2023-03-20 NOTE — ED PROVIDER NOTES
HPI     Date of Service:  3/20/2023    Patient:  [facility]    Chief Complaint:  Dehydration and Irregular Heart Beat       HPI:  [facility] is a 82 y.o.  female with a past medical history of atrial fibrillation with Watchman device, colon cancer status post colostomy who presents for evaluation of suspected dehydration. Per EMS, patient had a home health with her today, they found that she was having decreased urine output and felt she was dehydrated therefore EMS was called. Patient found to be in A-fib on EMS arrival, she does have a history of this. Patient denying associated symptoms including chest pain, shortness of breath, palpitations, lightheadedness or dizziness. Patient does endorse some decreased oral intake as well as decreased urine output. She does note a history of recurrent UTIs but denies any dysuria or frequency. She denies any chest pain or shortness of breath. No lightheadedness or dizziness. No fevers, cough, nausea or or vomiting. Does endorse yesterday she had increased stool output to her ostomy but today it has been normal.  No black or bloody stools. Past Medical History:   Diagnosis Date    Breast cancer ([facility] [address]     Colostomy present ([facility] [address]     Irregular heart beat 2020    watchman       Past Surgical History:   Procedure Laterality Date    HX BREAST LUMPECTOMY Right     HX ORTHOPAEDIC           History reviewed. No pertinent family history.     Social History     Socioeconomic History    Marital status:      Spouse name: Not on file    Number of children: Not on file    Years of education: Not on file    Highest education level: Not on file   Occupational History    Not on file   Tobacco Use    Smoking status: Never    Smokeless tobacco: Never   Substance and Sexual Activity    Alcohol use: Never    Drug use: Never    Sexual activity: Not on file   Other Topics Concern    Not on file   Social History Narrative    Not on file     Social Determinants of Health Financial Resource Strain: Not on file   Food Insecurity: Not on file   Transportation Needs: Not on file   Physical Activity: Not on file   Stress: Not on file   Social Connections: Not on file   Intimate Partner Violence: Not on file   Housing Stability: Not on file         ALLERGIES: Amoxicillin, Augmentin [amoxicillin-pot clavulanate], Avelox [moxifloxacin], Celebrex [celecoxib], Ciprofloxacin, Clindamycin, Penicillin g, Phenergan [promethazine], Pyridium [phenazopyridine], Sulfa (sulfonamide antibiotics), and Tramadol    Review of Systems   Constitutional:  Negative for chills and fever. HENT:  Negative for congestion and rhinorrhea. Eyes:  Negative for discharge and redness. Respiratory:  Negative for cough and shortness of breath. Cardiovascular:  Negative for chest pain. Gastrointestinal:  Negative for abdominal pain, diarrhea, nausea and vomiting. Genitourinary:  Positive for decreased urine volume. Negative for dysuria and hematuria. Neurological:  Negative for speech difficulty and light-headedness. Psychiatric/Behavioral:  Negative for agitation and confusion. Vitals:    03/20/23 1456 03/20/23 1504   BP: (!) 142/97    Pulse: (!) 126 (!) 128   Resp: 16 19   Temp: 97.9 °F (36.6 °C)    SpO2: 96% 96%   Weight: 68 kg (150 lb)    Height: 5' 8\" (1.727 m)             Physical Exam  Vitals and nursing note reviewed. Constitutional:       General: She is not in acute distress. Appearance: She is not ill-appearing or toxic-appearing. HENT:      Head: Normocephalic. Mouth/Throat:      Mouth: Mucous membranes are dry. Eyes:      General: No scleral icterus. Right eye: No discharge. Left eye: No discharge. Conjunctiva/sclera: Conjunctivae normal.   Cardiovascular:      Rate and Rhythm: Tachycardia present. Rhythm irregular. Pulses: Normal pulses. Pulmonary:      Effort: Pulmonary effort is normal. No respiratory distress.    Abdominal:      General: Abdomen is flat. The ostomy site is clean. Palpations: Abdomen is soft. Tenderness: There is no abdominal tenderness. There is no guarding or rebound. Musculoskeletal:         General: Normal range of motion. Right lower leg: No edema. Left lower leg: No edema. Skin:     General: Skin is warm and dry. Capillary Refill: Capillary refill takes less than 2 seconds. Neurological:      General: No focal deficit present. Mental Status: She is alert and oriented to person, place, and time. Psychiatric:         Mood and Affect: Mood normal.         Behavior: Behavior normal.        Medical Decision Making      DECISION MAKING:  Kwsei Mcginnis is a 82 y.o. female who comes in as above. ECG on arrival does show atrial fibrillation with RVR. Patient is unclear what her medications are. Will confirm with her son. Patient will be evaluated for reported dehydration with labs, UA and she will be given IV fluids. CBC is without leukocytosis or anemia. Electrolytes, kidney function and LFTs within normal limits. UA is negative for infection. Chest x-ray negative for pneumonia or other acute cardiopulmonary process. Despite IV fluid administration, heart rate does remain primarily in the 115's range therefore will trial a dose of IV metoprolol. After IV metoprolol heart rate did improve to the 90s, will give a dose of oral metoprolol to maintain heart rate. Subsequently reviewed patient's medications with her son, Kalpesh Giang, who thinks patient was taken off both of her flecainide as well as metoprolol. Discussed at length with son regarding resuming the metoprolol, she will be given a dose here and I will send another prescription to her pharmacy if they do not have any at home. He was encouraged that patient should follow-up with her primary care doctor as well as cardiologist.  He verbalized understanding and patient will be discharged home.     After oral metoprolol, heart rate did improve down to the 90s. Patient pending transportation home. Amount and/or Complexity of Data Reviewed  Labs: ordered. Decision-making details documented in ED Course. Radiology: ordered. Decision-making details documented in ED Course. ECG/medicine tests: ordered and independent interpretation performed. Decision-making details documented in ED Course. Risk  Prescription drug management. ED Course as of 03/20/23 1524   Mon Mar 20, 2023   1522 EKG, 12 LEAD, INITIAL  ECG at 1500, interpreted by me: A-fib with RVR, rate 120 bpm.  Normal axis. QTc 505 ms. Diffuse T wave inversions. No ST elevations. [SH]      ED Course User Index  [SH] Luis F Leo, DO       Procedures        LABS:  Recent Results (from the past 6 hour(s))   CBC WITH AUTOMATED DIFF    Collection Time: 03/20/23  2:55 PM   Result Value Ref Range    WBC 6.3 3.6 - 11.0 K/uL    RBC 4.29 3.80 - 5.20 M/uL    HGB 13.3 11.5 - 16.0 g/dL    HCT 40.5 35.0 - 47.0 %    MCV 94.4 80.0 - 99.0 FL    MCH 31.0 26.0 - 34.0 PG    MCHC 32.8 30.0 - 36.5 g/dL    RDW 14.5 11.5 - 14.5 %    PLATELET 307 468 - 447 K/uL    MPV 9.4 8.9 - 12.9 FL    NRBC 0.0 0  WBC    ABSOLUTE NRBC 0.00 0.00 - 0.01 K/uL    NEUTROPHILS 56 32 - 75 %    LYMPHOCYTES 35 12 - 49 %    MONOCYTES 8 5 - 13 %    EOSINOPHILS 1 0 - 7 %    BASOPHILS 0 0 - 1 %    IMMATURE GRANULOCYTES 0 0.0 - 0.5 %    ABS. NEUTROPHILS 3.5 1.8 - 8.0 K/UL    ABS. LYMPHOCYTES 2.2 0.8 - 3.5 K/UL    ABS. MONOCYTES 0.5 0.0 - 1.0 K/UL    ABS. EOSINOPHILS 0.0 0.0 - 0.4 K/UL    ABS. BASOPHILS 0.0 0.0 - 0.1 K/UL    ABS. IMM.  GRANS. 0.0 0.00 - 0.04 K/UL    DF AUTOMATED     METABOLIC PANEL, COMPREHENSIVE    Collection Time: 03/20/23  2:55 PM   Result Value Ref Range    Sodium 139 136 - 145 mmol/L    Potassium 4.1 3.5 - 5.1 mmol/L    Chloride 104 97 - 108 mmol/L    CO2 24 21 - 32 mmol/L    Anion gap 11 5 - 15 mmol/L    Glucose 148 (H) 65 - 100 mg/dL    BUN 18 6 - 20 MG/DL    Creatinine 0.71 0.55 - 1.02 MG/DL BUN/Creatinine ratio 25 (H) 12 - 20      eGFR >60 >60 ml/min/1.73m2    Calcium 8.8 8.5 - 10.1 MG/DL    Bilirubin, total 0.4 0.2 - 1.0 MG/DL    ALT (SGPT) 28 12 - 78 U/L    AST (SGOT) 24 15 - 37 U/L    Alk. phosphatase 118 (H) 45 - 117 U/L    Protein, total 7.0 6.4 - 8.2 g/dL    Albumin 3.5 3.5 - 5.0 g/dL    Globulin 3.5 2.0 - 4.0 g/dL    A-G Ratio 1.0 (L) 1.1 - 2.2     MAGNESIUM    Collection Time: 03/20/23  2:55 PM   Result Value Ref Range    Magnesium 1.8 1.6 - 2.4 mg/dL   EKG, 12 LEAD, INITIAL    Collection Time: 03/20/23  3:00 PM   Result Value Ref Range    Ventricular Rate 128 BPM    Atrial Rate 133 BPM    QRS Duration 96 ms    Q-T Interval 346 ms    QTC Calculation (Bezet) 505 ms    Calculated R Axis 21 degrees    Calculated T Axis -137 degrees    Diagnosis       Atrial fibrillation with rapid ventricular response  ST & T wave abnormality, consider inferior ischemia  ST & T wave abnormality, consider anterolateral ischemia  Abnormal ECG  No previous ECGs available     URINALYSIS W/MICROSCOPIC    Collection Time: 03/20/23  3:32 PM   Result Value Ref Range    Color YELLOW/STRAW      Appearance CLEAR CLEAR      Specific gravity 1.025 1.003 - 1.030      pH (UA) 6.0 5.0 - 8.0      Protein Negative NEG mg/dL    Glucose Negative NEG mg/dL    Ketone Negative NEG mg/dL    Bilirubin Negative NEG      Blood TRACE (A) NEG      Urobilinogen 0.2 0.2 - 1.0 EU/dL    Nitrites Negative NEG      Leukocyte Esterase Negative NEG      WBC 0-4 0 - 4 /hpf    RBC 5-10 0 - 5 /hpf    Epithelial cells FEW FEW /lpf    Bacteria Negative NEG /hpf    Mucus TRACE (A) NEG /lpf   URINE CULTURE HOLD SAMPLE    Collection Time: 03/20/23  3:32 PM    Specimen: Urine   Result Value Ref Range    Urine culture hold        Urine on hold in Microbiology dept for 2 days. If unpreserved urine is submitted, it cannot be used for addtional testing after 24 hours, recollection will be required.         IMAGING:  XR CHEST PORT   Final Result   Minimal bibasilar atelectasis. [place] Jaciel Pink Jaciel Medications During Visit:  Medications   sodium chloride 0.9 % bolus infusion 1,000 mL (0 mL IntraVENous IV Completed 3/20/23 1639)   metoprolol (LOPRESSOR) injection 2.5 mg (2.5 mg IntraVENous Given 3/20/23 1653)   metoprolol tartrate (LOPRESSOR) tablet 12.5 mg (12.5 mg Oral Given 3/20/23 1800)       IMPRESSION:  1. Dehydration    2. Atrial fibrillation with rapid ventricular response (HCC)        DISPOSITION:  Discharged      Discharge Medication List as of 3/20/2023  5:23 PM        CONTINUE these medications which have CHANGED    Details   metoprolol tartrate (LOPRESSOR) 25 mg tablet Take 1 Tablet by mouth two (2) times a day for 30 days. 1 tab, Normal, Disp-60 Tablet, R-0           CONTINUE these medications which have NOT CHANGED    Details   diclofenac (VOLTAREN) 1 % gel Apply  to affected area three (3) times daily. 1 application, Historical Med      fluticasone propionate (FLONASE) 50 mcg/actuation nasal spray 1 Muse by Both Nostrils route two (2) times a day., Historical Med      magnesium oxide (MAG-OX) 400 mg tablet Take 400 mg by mouth daily. 1 tab, Historical Med      melatonin 3 mg tablet Take 3 mg by mouth nightly. 1 tab, Historical Med      valACYclovir (VALTREX) 500 mg tablet Take 500 mg by mouth two (2) times a day. 1 tab, Historical Med      acetaminophen (TYLENOL) 500 mg tablet Take 500 mg by mouth every six (6) hours as needed for Pain. 1 TAB. For mild to moderate pain 1-6, Historical Med      apixaban (ELIQUIS) 5 mg tablet Take 5 mg by mouth two (2) times a day. 1 tab, Historical Med      atorvastatin (LIPITOR) 20 mg tablet Take 20 mg by mouth daily. 1 tab, Historical Med      cholecalciferol, vitamin d3, (Vitamin D3) 10 mcg (400 unit) cap Take 1 Tablet by mouth daily. , Historical Med      estradioL (ESTRACE) 0.01 % (0.1 mg/gram) vaginal cream Insert 1 g into vagina two (2) times a week., Historical Med      famotidine (PEPCID) 20 mg tablet Take 20 mg by mouth two (2) times a day. 1 tab as needed for indigestion, Historical Med      flecainide (TAMBOCOR) 100 mg tablet Take 100 mg by mouth two (2) times a day. 1 tab, Historical Med      gabapentin (NEURONTIN) 300 mg capsule Take 300 mg by mouth two (2) times a day., Historical Med      lidocaine 4 % patch 1 Patch by TransDERmal route every twelve (12) hours. left knee, Historical Med      multivit with iron,minerals (MULTIVITAMIN AND MINERALS PO) Take 1 Tablet by mouth daily. , Historical Med      furosemide (LASIX) 20 mg tablet Take 1 Tablet by mouth daily. , Normal, Disp-7 Tablet, R-0              Follow-up Information       Follow up With Specialties Details Why Contact Info    [facility] Emergency Medicine  If symptoms worsen Adriana Mishra [address] [facility] [phone]    you cardiologist  Schedule an appointment as soon as possible for a visit                 The patient is asked to follow-up with their primary care provider in the next several days. They are to call tomorrow for an appointment. The patient is asked to return promptly for any increased concerns or worsening of symptoms. They can return to this emergency department or any other emergency department.     Anai Lira,

## 2023-03-20 NOTE — Clinical Note
Patient sent to ED today by EMS for BP <80/60 and HR irregular and ranging from  (could not assess radially d/t irregularity and elevation. Was treated in ED for dehydration and afib. Will be discharging home tonight.  Notified Euna Siemens and Aleksandra Noel.

## 2023-03-21 ENCOUNTER — HOME CARE VISIT (OUTPATIENT)
Dept: SCHEDULING | Facility: HOME HEALTH | Age: 88
End: 2023-03-21
Payer: MEDICARE

## 2023-03-21 LAB
ATRIAL RATE: 133 BPM
CALCULATED R AXIS, ECG10: 21 DEGREES
CALCULATED T AXIS, ECG11: -137 DEGREES
DIAGNOSIS, 93000: NORMAL
Q-T INTERVAL, ECG07: 346 MS
QRS DURATION, ECG06: 96 MS
QTC CALCULATION (BEZET), ECG08: 505 MS
VENTRICULAR RATE, ECG03: 128 BPM

## 2023-03-21 PROCEDURE — G0152 HHCP-SERV OF OT,EA 15 MIN: HCPCS

## 2023-03-22 ENCOUNTER — HOME CARE VISIT (OUTPATIENT)
Dept: HOME HEALTH SERVICES | Facility: HOME HEALTH | Age: 88
End: 2023-03-22
Payer: MEDICARE

## 2023-03-22 ENCOUNTER — HOME CARE VISIT (OUTPATIENT)
Dept: SCHEDULING | Facility: HOME HEALTH | Age: 88
End: 2023-03-22
Payer: MEDICARE

## 2023-03-22 PROCEDURE — G0152 HHCP-SERV OF OT,EA 15 MIN: HCPCS

## 2023-03-22 PROCEDURE — G0299 HHS/HOSPICE OF RN EA 15 MIN: HCPCS

## 2023-03-23 ENCOUNTER — HOME CARE VISIT (OUTPATIENT)
Dept: SCHEDULING | Facility: HOME HEALTH | Age: 88
End: 2023-03-23
Payer: MEDICARE

## 2023-03-23 VITALS
TEMPERATURE: 97 F | OXYGEN SATURATION: 97 % | RESPIRATION RATE: 16 BRPM | SYSTOLIC BLOOD PRESSURE: 112 MMHG | DIASTOLIC BLOOD PRESSURE: 70 MMHG | HEART RATE: 98 BPM

## 2023-03-23 VITALS
RESPIRATION RATE: 18 BRPM | OXYGEN SATURATION: 98 % | DIASTOLIC BLOOD PRESSURE: 64 MMHG | HEART RATE: 82 BPM | SYSTOLIC BLOOD PRESSURE: 118 MMHG | TEMPERATURE: 97.3 F

## 2023-03-23 VITALS
SYSTOLIC BLOOD PRESSURE: 112 MMHG | RESPIRATION RATE: 16 BRPM | DIASTOLIC BLOOD PRESSURE: 70 MMHG | OXYGEN SATURATION: 97 % | HEART RATE: 98 BPM | TEMPERATURE: 97 F

## 2023-03-23 PROCEDURE — G0151 HHCP-SERV OF PT,EA 15 MIN: HCPCS

## 2023-03-23 NOTE — HOME HEALTH
Subjective: Patient states she hasn't had any imporovement with her vaginal pain. Falls since last visit NO(if yes complete the Fall Tracking Form and include bsrifallreport):   Caregiver involvement changes: N/A  Home health supplies by type and quantity ordered/delivered this visit include: All supplies in the home    Clinician asked if patient has had any physician contact since last home care visit and patient states: NO  Clinician asked if patient has any new or changed medications and patient states:  NO   If Yes, were medications reconciled? N/A   Was the certifying physician notified of changes in medications? N/A     Clinical assessment (what this visit means for the patient overall and need for ongoing skilled care) and progress or lack of progress towards SPECIFIC goals: Patient has met all goals except for her vaginal pain/medication use. She cannot use the applicator well and has lost it, and hasn't been using it at all. SN needed to make sure this gets taken care of with MD. MD contacted about this problem. Interdisciplinary communication with: N/A  Discharge planning as follows:  Is no longer homebound

## 2023-03-23 NOTE — HOME HEALTH
Subjective: I am feeling  really rushed this morning but other than that, I am ok. Falls since last visit NO(if yes complete the Fall Tracking Form and include bsrifallreport):  Caregiver involvement changes: no changes. Home health supplies by type and quantity ordered/delivered this visit include: NA    Clinician asked if patient has had any physician contact since last home care visit and patient states: no  Clinician asked if patient has any new or changed medications and patient states:  NO   If Yes, were medications reconciled? N/A   Was the certifying physician notified of changes in medications? N/A     Clinical assessment (what this visit means for the patient overall and need for ongoing skilled care) and progress or lack of progress towards SPECIFIC goals: Patient has made progress towards OT goals since initial eval. Upper body strength has increased from 3/5 to 3+/5. Has made small gains on Modified Barthel Index increasing her score from 50/100 to 55/100 indicating she continues to need mod assistance for ADLS. At eval, pt completed ADLS with mod A from chair level. Now, pt is able to walk with asisstance into bathroom. Pt is copmleting dressing from chair level and set up assist. Nalinimarilu Tavia is able to to assist with breakfast and lunch and luandry. Pt is in the home by herself in the evenings . Impaired safety and cognition continue to put patient at risk for falls. Pt remains below her baseline. Barriers to care include recent ER visit due to AFIB. It is recommended to continue skilled occupational therapy services to increased safety and independence with ADLS. Written Teaching Material Utilized: N/A    Interdisciplinary communication with: Mary Clark for the purpose of POC collaboration    Discharge planning as follows: Will discharge when the patient has reached their maximum functional potential and maximum safety in their home and When goals are met.  Extend OT 2w4 for continued ADL training/IADL training, home safety and transfer training. Specific plan for next visit: Re-instruct patient/caregiver on meal prep, safety.     .  Modified Barthel:   Feeding  10  Bathing 0   Grooming  5  Dressing 0 5  Bowels 10  Bladder 0 5  Toilet Use 0 5   Transfer  10  Mobility 0 5   Stairs 0   55/100- mod A needed for ADLS

## 2023-03-23 NOTE — HOME HEALTH
Subjective: Patient states: \" I am still tired from going to ER. \"        Falls since last visit NO(if yes complete the Fall Tracking Form and include bsrifallreport):      Caregiver involvement changes: None        Home health supplies by type and quantity ordered/delivered this visit include: None       Clinician asked if patient has had any physician contact since last home care visit and patient states: NO         Clinician asked if patient has any new or changed medications and patient states:  NO         If Yes, were medications reconciled? N/A          Was the certifying physician notified of changes in medications? N/A         Clinical assessment (what this visit means for the patient overall and need for ongoing skilled care) and progress or lack of progress towards SPECIFIC goals: Today pt reports feeling fatiuged from going out to ER. Physical Therapist sent pt to ER yesterday due to AFib and possible dehydration. Pt VS WFL today. Continue to instruct pt to get H2O bottle and fill daily to record H2O intake due to pt not drinking much during the day. Pt is increasing H2O intake today. Will discuss with cg at next visit due to already leaving for the day. Today continue to instruct in kitchen safety due to needing more training on kitchne safety. Pt has toaster oven but has difficulty with seeing numbers on tempture. Will birng marker to to darrick for safety with adding visual cues next visit. Continue to instruct in increased standing tolerance in prep for improved balance for access to microwave and laundry task due to stackable washer/dryer for increased safety with IADL's. Pt was able to ambulate with RW down sánchez and back in apt building today with CGA-VC'ing for safety. Pt desires to increase activity tolerance and request walking due to not able to walk unless with OT or PT. Continue to instruct in IADL setup; fall prevention; and home safety due to goals not reached.  Celio Rodriguez, OTR/L requested referal for Home Based Primary. see interventions for safety. Written Teaching Material Utilized: N/A        Interdisciplinary communication with: Adriana Kaur PT for the purpose of POC/collaboration. Discharge planning as follows: Will discharge when the patient has reached their maximum functional potential with ADL/IADL goals and maximum safety in their home. Specific plan for next visit: ADL/IADL safety; pain management; fall prevention/home safety.

## 2023-03-24 VITALS
TEMPERATURE: 97.8 F | OXYGEN SATURATION: 96 % | HEART RATE: 60 BPM | SYSTOLIC BLOOD PRESSURE: 123 MMHG | RESPIRATION RATE: 14 BRPM | DIASTOLIC BLOOD PRESSURE: 64 MMHG

## 2023-03-27 ENCOUNTER — HOME CARE VISIT (OUTPATIENT)
Dept: SCHEDULING | Facility: HOME HEALTH | Age: 88
End: 2023-03-27
Payer: MEDICARE

## 2023-03-27 VITALS
RESPIRATION RATE: 18 BRPM | OXYGEN SATURATION: 96 % | HEART RATE: 66 BPM | SYSTOLIC BLOOD PRESSURE: 122 MMHG | DIASTOLIC BLOOD PRESSURE: 68 MMHG | TEMPERATURE: 97.2 F

## 2023-03-27 PROCEDURE — G0151 HHCP-SERV OF PT,EA 15 MIN: HCPCS

## 2023-03-27 PROCEDURE — G0152 HHCP-SERV OF OT,EA 15 MIN: HCPCS

## 2023-03-27 NOTE — HOME HEALTH
Subjective: Patient states her knee was bothering her a lot yesterday, but it is better today. Falls since last visit NO(if yes complete the Fall Tracking Form and include bsrifallreport):  Caregiver involvement changes: None  Home health supplies by type and quantity ordered/delivered this visit include: None    Clinician asked if patient has had any physician contact since last home care visit and patient states: NO  Clinician asked if patient has any new or changed medications and patient states:  NO   If Yes, were medications reconciled? N/A   Was the certifying physician notified of changes in medications? N/A     Clinical assessment (what this visit means for the patient overall and need for ongoing skilled care) and progress or lack of progress towards SPECIFIC goals: Patient is limited with gait d/t pain and joint instability in left knee. Gait goal not met. Balance is improving as well but continues to be a fall risk. Balance goal not met. Written Teaching Material Utilized: N/A    Interdisciplinary communication with: Liv Senior and Jordi Leger for the purpose of POC collaboration    Discharge planning as follows:  When goals are met    Specific plan for next visit: continue with balance and gait

## 2023-03-28 NOTE — HOME HEALTH
Subjective: Patient states: \" I used to love to cook. \"         Falls since last visit NO(if yes complete the Fall Tracking Form and include bsrifallreport):           Caregiver involvement changes: None          Home health supplies by type and quantity ordered/delivered this visit include: None            Clinician asked if patient has had any physician contact since last home care visit and patient states: NO           Clinician asked if patient has any new or changed medications and patient states:  NO           If Yes, were medications reconciled? N/A          Was the certifying physician notified of changes in medications? N/A         Clinical assessment (what this visit means for the patient overall and need for ongoing skilled care) and progress or lack of progress towards SPECIFIC goals: Today pt had new cg. Instructed in need to assist pt in preparing meals for dinner. Today marked toaster oven due to pt having difficulty seeing settings. Recommended regular toaster for safety and ease. Today instructed in kitchen safety. Pt has electric frying pan opposed to using stove due to improved safety. Today made porkchops using electric frying pan with min A and SBA for safety. Pt was able to recall recipe for breading porkchops/and was able to access refrigerator to obtain needed items from w/c level. Pt was able to stand for shot time periods as needed to access dishes needed with SBA-vc'ing for meal prep. Recommended cg to assist with cooking at stove top and pt agrees. However, pt's cg are not coming on weekends. Working to get all paid cg to assist with preparing dinner meal prior to leaving at 2pm on weekdays. Pt's microwave is above stove and recommended counter top microwave for safety due to R shoulder pain; knee pain and buckling at times and decreased safety. Continue to instruct pt/cg in increasing H2O intake daily. Pt is increasing H2O intake today.  Discussed options for incontinence pads for larger size at night and pt made list of needed items. Today continue to instruct in kitchen safety due to needing more training on kitchen safety. Continue to instruct in increased standing tolerance in prep for improved balance for access to microwave and laundry task due to stackable washer/dryer for increased safety with IADL's. Pt desires to increase activity tolerance and safety. Continue to OT to instruct in IADL setup; meal prep safety; fall prevention; and home safety due to goals not reached. Bunny Number, OTR/L requesting referral for Home Based Primary. see interventions for safety. Written Teaching Material Utilized: N/A             Interdisciplinary communication with: Oanh Baird PT for the purpose of POC/collaboration. Discharge planning as follows: Will discharge when the patient has reached their maximum functional potential with ADL/IADL goals and maximum safety in their home. Specific plan for next visit: ADL/IADL safety; pain management; fall prevention/home safety.

## 2023-03-29 ENCOUNTER — HOME CARE VISIT (OUTPATIENT)
Dept: SCHEDULING | Facility: HOME HEALTH | Age: 88
End: 2023-03-29
Payer: MEDICARE

## 2023-03-29 PROCEDURE — G0299 HHS/HOSPICE OF RN EA 15 MIN: HCPCS

## 2023-03-31 ENCOUNTER — HOME CARE VISIT (OUTPATIENT)
Dept: SCHEDULING | Facility: HOME HEALTH | Age: 88
End: 2023-03-31
Payer: MEDICARE

## 2023-03-31 VITALS
TEMPERATURE: 97.8 F | SYSTOLIC BLOOD PRESSURE: 118 MMHG | HEART RATE: 78 BPM | DIASTOLIC BLOOD PRESSURE: 62 MMHG | OXYGEN SATURATION: 98 % | RESPIRATION RATE: 18 BRPM

## 2023-03-31 VITALS
HEART RATE: 87 BPM | OXYGEN SATURATION: 99 % | TEMPERATURE: 98.1 F | RESPIRATION RATE: 18 BRPM | DIASTOLIC BLOOD PRESSURE: 78 MMHG | SYSTOLIC BLOOD PRESSURE: 122 MMHG

## 2023-03-31 VITALS
SYSTOLIC BLOOD PRESSURE: 118 MMHG | HEART RATE: 78 BPM | DIASTOLIC BLOOD PRESSURE: 62 MMHG | RESPIRATION RATE: 18 BRPM | OXYGEN SATURATION: 98 % | TEMPERATURE: 97.8 F

## 2023-03-31 PROCEDURE — G0151 HHCP-SERV OF PT,EA 15 MIN: HCPCS

## 2023-03-31 PROCEDURE — G0152 HHCP-SERV OF OT,EA 15 MIN: HCPCS

## 2023-03-31 NOTE — HOME HEALTH
Subjective: patient reports being a little tired after OT visit but willing to work on some balance  Falls since last visit NO(if yes complete the Fall Tracking Form and include bsrifallreport):  Caregiver involvement changes: none-states her aide did not show up today  Home health supplies by type and quantity ordered/delivered this visit include: none    Clinician asked if patient has had any physician contact since last home care visit and patient states: NO  Clinician asked if patient has any new or changed medications and patient states:  NO    If Yes, were medications reconciled? N/A    Was the certifying physician notified of changes in medications? N/A      Clinical assessment (what this visit means for the patient overall and need for ongoing skilled care) and progress or lack of progress towards SPECIFIC goals: Patient able to progess standing balance exercises this visit but required multiple rest breaks due to pain, left knee weakness. Patient challenged with all unsupported activities contributing to fall risk with functional mobility. Continued PT needed for balance and gait training to decrease fall risk. Written Teaching Material Utilized: N/A    Interdisciplinary communication with: ASTON ralph and VÍCTOR sheets for the purpose of POC collaboration    Discharge planning as follows:  Will discharge when the patient has reached their maximum functional potential and maximum safety in their home    Specific plan for next visit: standing balance, gait training

## 2023-03-31 NOTE — HOME HEALTH
Subjective: Patient states she is doing better. Falls since last visit NO(if yes complete the Fall Tracking Form and include bsrifallreport):   Caregiver involvement changes: N/A  Home health supplies by type and quantity ordered/delivered this visit include: All supplies in the home    Clinician asked if patient has had any physician contact since last home care visit and patient states: NO  Clinician asked if patient has any new or changed medications and patient states:  NO   If Yes, were medications reconciled? N/A   Was the certifying physician notified of changes in medications? N/A     Clinical assessment (what this visit means for the patient overall and need for ongoing skilled care) and progress or lack of progress towards SPECIFIC goals: Patient has reached all nursing goals- only complaint is a sty she has had. Educated on warm compresses and to reach out to her MD if it doesn't get better. At risk for falls.    Interdisciplinary communication with: N/A  Discharge planning as follows: Patient discharged from nursing

## 2023-04-03 ENCOUNTER — HOME CARE VISIT (OUTPATIENT)
Dept: SCHEDULING | Facility: HOME HEALTH | Age: 88
End: 2023-04-03
Payer: MEDICARE

## 2023-04-03 PROCEDURE — G0152 HHCP-SERV OF OT,EA 15 MIN: HCPCS

## 2023-04-03 NOTE — HOME HEALTH
Subjective: Patient states: \" My cg did not come today. \"        Falls since last visit NO(if yes complete the Fall Tracking Form and include bsrifallreport):         Caregiver involvement changes: None        Home health supplies by type and quantity ordered/delivered this visit include: None        Clinician asked if patient has had any physician contact since last home care visit and patient states: NO        Clinician asked if patient has any new or changed medications and patient states:  NO         If Yes, were medications reconciled? N/A          Was the certifying physician notified of changes in medications? N/A           Clinical assessment (what this visit means for the patient overall and need for ongoing skilled care) and progress or lack of progress towards SPECIFIC goals: Today continue to instruct pt in light meal prep; kitchen safety; and improved sanding tolerance for IADL task. Pt had no cg come today due to her cg calling out sick. Pt was able to make sandwhich with vc'ingfor safety. Continue to instruct in kitchen safety and kitchen setup for improved safety. Today continue to instruct in kitchen safety due to needing more training on kitchen safety. Continue to instruct in increased standing tolerance in prep for improved balance for access to microwave and laundry task due to stackable washer/dryer for increased safety with IADL's. Pt desires to increase activity tolerance and safety. Continue to OT to instruct in IADL setup; meal prep safety; fall prevention; and home safety due to goals not reached. Working on getting pt setup with home based primary. Pt's w/c brake screw came out and was able to get screww back in and adjusted. Waiting on new light weight w/c for improved mobility and community access. Pt is improving but has not reached goals. Bridgette Ames, OTR/L requesting referral for Home Based Primary and working to get setup. see interventions.            Written Teaching Material Utilized: N/A           Interdisciplinary communication with: Rachell Chavez for the purpose of POC/collaboration. Discharge planning as follows: Will discharge when the patient has reached their maximum functional potential with ADL/IADL goals and maximum safety in their home. Specific plan for next visit: ADL/IADL safety; pain management; fall prevention/home safety.

## 2023-04-05 ENCOUNTER — HOME CARE VISIT (OUTPATIENT)
Dept: SCHEDULING | Facility: HOME HEALTH | Age: 88
End: 2023-04-05
Payer: MEDICARE

## 2023-04-05 NOTE — HOME HEALTH
Subjective: Patient states: \" My shoulders hurt from pushing my chair. \"       Falls since last visit NO(if yes complete the Fall Tracking Form and include bsrifallreport):         Caregiver involvement changes: None        Home health supplies by type and quantity ordered/delivered this visit include: None        Clinician asked if patient has had any physician contact since last home care visit and patient states: NO         Clinician asked if patient has any new or changed medications and patient states:  NO         If Yes, were medications reconciled? N/A          Was the certifying physician notified of changes in medications? N/A         Clinical assessment (what this visit means for the patient overall and need for ongoing skilled care) and progress or lack of progress towards SPECIFIC goals: Today instructed pt on use of Rollator for ambulation in [place] due to having seat for seated rest. Pt was able to ambulate using Rollator with good safety and CGA down sánchez for extended walk and worked with new cg came for training. Pt desires to ambulate more and knee pain limits pt at times. Pt's balance is improving and activity tolerance continues to improve. Continue to instruct pt in light meal prep and kitchen safety. Pt was able to make soup with SBA-VC'ing for safety. Continue to instruct in kitchen safety and kitchen setup for improved safety. Continue to instruct in increased standing tolerance in prep for improved balance for access to microwave/cabinets for needed dishes and laundry task due to stackable washer/dryer for increased safety with IADL's. Pt desires to increase activity tolerance and safety. Continue to OT to instruct in IADL setup; meal prep safety; fall prevention; and home safety due to goals not reached. Working on getting pt setup with home based primary. Waiting on new light weight w/c for improved mobility and community access.  Pt wants to talk with PT in reference to power mobility and unsure pt meets need critera. Pt is improving but has not reached goals. Archana Fuentes, OTR/L requesting referral for Home Based Primary and working to get setup. see interventions. Written Teaching Material Utilized: N/A       Interdisciplinary communication with: Stephanie Fraga PT for the purpose of POC/collaboration. Discharge planning as follows: Will discharge when the patient has reached their maximum functional potential with ADL/IADL goals and maximum safety in their home. Specific plan for next visit: IADL safety; pain management; fall prevention/home safety.

## 2023-04-06 ENCOUNTER — HOME CARE VISIT (OUTPATIENT)
Dept: SCHEDULING | Facility: HOME HEALTH | Age: 88
End: 2023-04-06
Payer: MEDICARE

## 2023-04-07 ENCOUNTER — HOME CARE VISIT (OUTPATIENT)
Dept: SCHEDULING | Facility: HOME HEALTH | Age: 88
End: 2023-04-07
Payer: MEDICARE

## 2023-04-14 ENCOUNTER — HOME CARE VISIT (OUTPATIENT)
Dept: SCHEDULING | Facility: HOME HEALTH | Age: 88
End: 2023-04-14
Payer: MEDICARE

## 2023-04-14 PROCEDURE — G0152 HHCP-SERV OF OT,EA 15 MIN: HCPCS

## 2023-04-17 ENCOUNTER — HOME CARE VISIT (OUTPATIENT)
Dept: SCHEDULING | Facility: HOME HEALTH | Age: 88
End: 2023-04-17
Payer: MEDICARE

## 2023-04-17 VITALS
SYSTOLIC BLOOD PRESSURE: 118 MMHG | TEMPERATURE: 97.5 F | HEART RATE: 84 BPM | OXYGEN SATURATION: 99 % | DIASTOLIC BLOOD PRESSURE: 64 MMHG | RESPIRATION RATE: 18 BRPM

## 2023-04-17 PROCEDURE — G0152 HHCP-SERV OF OT,EA 15 MIN: HCPCS

## 2023-04-17 NOTE — HOME HEALTH
Subjective: Patient states: \" When my aides don't come I still need to shower. I am still afraid of the transfer in and out of shower. \"         Falls since last visit NO(if yes complete the Fall Tracking Form and include bsrifallreport):           Caregiver involvement changes: None           Home health supplies by type and quantity ordered/delivered this visit include: None         Clinician asked if patient has had any physician contact since last home care visit and patient states: NO            Clinician asked if patient has any new or changed medications and patient states:  NO         If Yes, were medications reconciled? N/A         Was the certifying physician notified of changes in medications? N/A        Clinical assessment (what this visit means for the patient overall and need for ongoing skilled care) and progress or lack of progress towards SPECIFIC goals: Continue to instruct pt in shower transfer safety. Pt has cg 3 x week but cg have not been consistent and sometimes agency is short staff and cg don't come. Continue to instruct in shower transfers/ safety/balance/and mobility. Pt able to transfer in and out of shower with SBA for safety and VC'ing. Balance and R knee weakness continues to be a concern due to increases risk for falls. Continue to instruct pt on use of Rollator for ambulation in [place] due to having seat for seated rest. Pt was able to ambulate using Rollator with good safety and CGA-SBA down sánchez for extended walk  for increased activity toleracne; prolonged standing; and balance in prep for improved safety with higher level balance. All activites are on 1st floor and mail is on first floor. Pt desires to ambulate more and knee pain limits pt at times and pt has ortho appointment scheduled for knee injection. Pt's balance is improving and activity tolerance continues to improve. Continue to instruct pt in light meal prep and kitchen safety. Pt able to make light meal with VC'ing. Continue to instruct in kitchen safety and kitchen setup for improved safety. Continue to instruct in increased standing tolerance in prep for improved balance for access to microwave/cabinets for needed dishes and laundry task due to shakable washer/dryer for increased safety with IADL's. Pt desires to increase activity tolerance and safety. Continue to work on improved activity tolerance and balance. Pt's mailroom is on first floor and working to be able to get mail iND. Pt continues to increase H20 intake and has new H20 bottles. Continue to OT to instruct in IADL setup; meal prep safety; fall prevention; and home safety due to goals not reached. Working on getting pt setup with home based primary. Waiting on new light weight w/c for improved mobility and community access. Pt is improving with OT goals but has not reached goals. see interventions for details. Written Teaching Material Utilized: N/A        Interdisciplinary communication with: Carey Saldana PT for the purpose of POC/collaboration. Discharge planning as follows: Will discharge when the patient has reached their maximum functional potential with ADL/IADL goals and maximum safety in their home. Specific plan for next visit: IADL safety; pain management; fall prevention/home safety.

## 2023-04-18 ENCOUNTER — HOME CARE VISIT (OUTPATIENT)
Dept: HOME HEALTH SERVICES | Facility: HOME HEALTH | Age: 88
End: 2023-04-18
Payer: MEDICARE

## 2023-04-18 VITALS
RESPIRATION RATE: 18 BRPM | TEMPERATURE: 97.5 F | SYSTOLIC BLOOD PRESSURE: 142 MMHG | DIASTOLIC BLOOD PRESSURE: 70 MMHG | OXYGEN SATURATION: 97 % | HEART RATE: 76 BPM

## 2023-04-18 NOTE — HOME HEALTH
Subjective: Patient states: \" My shoulders hurt from pushing this w/c. I want to use my Rollator more. \"    Falls since last visit NO(if yes complete the Fall Tracking Form and include bsrifallreport):       Caregiver involvement changes: None       Home health supplies by type and quantity ordered/delivered this visit include: None     Clinician asked if patient has had any physician contact since last home care visit and patient states: NO       Clinician asked if patient has any new or changed medications and patient states:  NO       If Yes, were medications reconciled? N/A       Was the certifying physician notified of changes in medications? N/A        Clinical assessment (what this visit means for the patient overall and need for ongoing skilled care) and progress or lack of progress towards SPECIFIC goals: Today assisted pt in organizing closet for improved access for clothing for ADL setup. Continue to instruct pt in shower transfer safety and ADL setup. Pt has cg 3 x week but cg have not been consistent and sometimes agency is short staff and cg don't come. Continue to instruct in shower transfers/ safety/balance/and mobility due to cg are not always present and pt wants to shower at least every other day. Pt able to transfer in and out of shower with SBA-supervision for safety. Balance and R knee weakness continues to be a concern due to increases risk for falls due to R knee buckling. Continue to instruct pt on use of Rollator for ambulation in [place] for increased distance due to having to get mail and all social activities are on first floor. Pt enjoys going down to pool area. Pushing w/c has been problematic due to previous shoulder surgery and causing pain in shoulders. Pt ambulation and safety with using Rollator is improving and is hoping to use Rollator to go down to first floor with seated rest brakes.  Pt's safety with Rollator is improving and pt able to ambulate with SBA down sánchez for extended walk  for increased activity tolerance; prolonged standing; and balance in prep for improved safety with higher level balance. Pt desires to ambulate more and knee pain limits pt at times and pt has ortho appointment scheduled for knee injection. Pt's balance is improving and activity tolerance continues to improve. Continue to instruct pt in light meal prep and kitchen safety. Pt able to make light meal with VC'ing. Continue to instruct in kitchen safety and kitchen setup for improved safety. Continue to instruct in increased standing tolerance in prep for improved balance for access to microwave/cabinets for needed dishes and laundry task due to stackable washer/dryer for increased safety with IADL's. Pt desires to increase activity tolerance and safety. Continue to work on improved activity tolerance and balance. Pt continues to increase H20 intake and has new H20 bottles. Continue to OT to instruct in IADL setup; meal prep safety; fall prevention; and home safety due to goals not reached. Waiting on new light weight w/c for improved mobility and community access. Pt is improving with OT goals but has not reached goals. see interventions for details. Written Teaching Material Utilized: N/A         Interdisciplinary communication with: Julissa Real OT and Nicholas Tapia PT for the purpose of POC/collaboration. Discharge planning as follows: Will discharge when the patient has reached their maximum functional potential with ADL/IADL goals and maximum safety in their home. Specific plan for next visit: IADL safety; pain management; fall prevention/home safety.

## 2023-04-20 ENCOUNTER — HOME CARE VISIT (OUTPATIENT)
Dept: HOME HEALTH SERVICES | Facility: HOME HEALTH | Age: 88
End: 2023-04-20
Payer: MEDICARE

## 2023-04-20 ENCOUNTER — HOME CARE VISIT (OUTPATIENT)
Dept: SCHEDULING | Facility: HOME HEALTH | Age: 88
End: 2023-04-20
Payer: MEDICARE

## 2023-04-20 VITALS
HEART RATE: 68 BPM | RESPIRATION RATE: 16 BRPM | DIASTOLIC BLOOD PRESSURE: 80 MMHG | SYSTOLIC BLOOD PRESSURE: 120 MMHG | TEMPERATURE: 98.2 F | OXYGEN SATURATION: 97 %

## 2023-04-20 PROCEDURE — G0152 HHCP-SERV OF OT,EA 15 MIN: HCPCS

## 2023-04-20 PROCEDURE — G0151 HHCP-SERV OF PT,EA 15 MIN: HCPCS

## 2023-04-20 NOTE — HOME HEALTH
Subjective: I need help with this heart monitor. Falls since last visit NO(if yes complete the Fall Tracking Form and include bsrifallreport):  Caregiver involvement changes: no changes. Home health supplies by type and quantity ordered/delivered this visit include: NA    Clinician asked if patient has had any physician contact since last home care visit and patient states: NO  Clinician asked if patient has any new or changed medications and patient states:  NO   If Yes, were medications reconciled? NO   Was the certifying physician notified of changes in medications?  N/A     Clinical assessment (what this visit means for the patient overall and need for ongoing skilled care) and progress or lack of progress towards SPECIFIC goals: see d/samara    Written Teaching Material Utilized: d/c summary    Interdisciplinary communication with: Keri Barnes for the purpose of POC collaboration

## 2023-04-21 ENCOUNTER — HOME CARE VISIT (OUTPATIENT)
Dept: SCHEDULING | Facility: HOME HEALTH | Age: 88
End: 2023-04-21

## 2023-04-21 VITALS
SYSTOLIC BLOOD PRESSURE: 120 MMHG | HEART RATE: 68 BPM | DIASTOLIC BLOOD PRESSURE: 80 MMHG | TEMPERATURE: 98.2 F | RESPIRATION RATE: 16 BRPM | OXYGEN SATURATION: 97 %

## 2023-04-21 VITALS
OXYGEN SATURATION: 96 % | DIASTOLIC BLOOD PRESSURE: 80 MMHG | SYSTOLIC BLOOD PRESSURE: 130 MMHG | TEMPERATURE: 97.6 F | RESPIRATION RATE: 16 BRPM | HEART RATE: 65 BPM

## 2023-04-21 PROCEDURE — G0151 HHCP-SERV OF PT,EA 15 MIN: HCPCS

## 2023-04-21 NOTE — HOME HEALTH
Subjective: Patient went to cardiologist yesterday and is now wearing a heart monitor  Falls since last visit NO(if yes complete the Fall Tracking Form and include bsrifallreport):  Caregiver involvement changes: None  Home health supplies by type and quantity ordered/delivered this visit include: None    Clinician asked if patient has had any physician contact since last home care visit and patient states: YES  Clinician asked if patient has any new or changed medications and patient states:  NO   If Yes, were medications reconciled? N/A   Was the certifying physician notified of changes in medications? N/A     Clinical assessment (what this visit means for the patient overall and need for ongoing skilled care) and progress or lack of progress towards SPECIFIC goals: Patient continues to require assistance for ambulation. Gait goal not met. Patient demonstrates impaired balance, but progressing. Balance goal not met. Written Teaching Material Utilized: N/A    Interdisciplinary communication with: Emmanuel Baxter and Dr. Gulshan Matute Physician for the purpose of POC collaboration and Recertification notification    Discharge planning as follows:  When goals are met    Specific plan for next visit: recertification and gait training

## 2023-04-22 NOTE — HOME HEALTH
Physician Notification and Justification to Continue Services: Spoke with Dr. Myah Cadet nurse Kavita Garduno who gave the verbal order to continue with therapy and recertify patient    Justification for continued intermittent care: Patient requires skilled PT intervention d/t continued mobility and safety impairments from Hemiplegia and hemiparesis following cerebral infarction affecting right dominant side    Dr. Ellie Hunt notified of the following: the need for continued Physical Therapy home health services for above reasons, plan of care including visit frequency of 2w4 Physical Therapy for: additional assessment and education on safe mobility and improved ambulation    Written Teaching Material Utilized: NA    Subjective:Patient states she is really fearful of falling and is looking forward to continued assistance from PT  Caregiver: agency. Caregiver assists with: Medications, Meals, Bathing, ADL and Housekeeping Caregiver unable to assist with:Transportation Caregiver is available:Regularly Caregiver is present at this visit and did participate with clinician. Medications reconciled and all medications are available in the home this visit. A list of reconciled medications has been given to the patient/caregiver and a copy has been uploaded to media. Home health supplies by type and quantity ordered/delivered this visit include: None    Patient at risk for falls Yes:     Patient/caregiver instructed on plan of care and are agreeable to plan of care at this time. Discharge planning discussed with patient and caregiver. Discharge planning as follows: When goals are met. Patient/caregiver did verbalize agreement with discharge planning.    Specific plan for next visit: balance and gait    Interdisciplinary communication with: Dr. Ellie Hunt Physician for the purpose of POC collaboration    Emergency Preparedness: Patient/Caregiver instructed in the following:  Have one gallon of water per person for at least 3 days on hand.  Have non-perishable food for at least 3 days that do not need to be cooked. Have flashlights and batteries. Charge your cell phones and any back up lithium batteries for your cell phones. Have 3+ days of back up oxygen in your home. Have a phone in your home that is hard wired and does not require power. Have medication for a week in your home. Make sure you have a caregiver in the home to provide care in case your home health nurse cannot get to your house. Make sure you have all of your paperwork i.e. written emergency preparedness plan, Identification, insurance cards, DME phone number, physician and pharmacy phone number, agency phone number, and your medications in one place for easy access and in a zip lock bag to protect them. Take your Admission Handbook, written emergency preparedness plan, written medication list, necessary supplies and folder if you relocate in the event of an emergency, if possible. Call agency if you relocate so we can contact you. Patient/Caregiver verbalize knowledge of above through teach back with 85 percent accuracy.

## 2023-04-25 ENCOUNTER — HOME CARE VISIT (OUTPATIENT)
Dept: SCHEDULING | Facility: HOME HEALTH | Age: 88
End: 2023-04-25
Payer: MEDICARE

## 2023-04-25 PROCEDURE — G0151 HHCP-SERV OF PT,EA 15 MIN: HCPCS

## 2023-04-26 VITALS
DIASTOLIC BLOOD PRESSURE: 76 MMHG | SYSTOLIC BLOOD PRESSURE: 126 MMHG | RESPIRATION RATE: 18 BRPM | TEMPERATURE: 98.3 F | OXYGEN SATURATION: 98 % | HEART RATE: 74 BPM

## 2023-04-26 NOTE — HOME HEALTH
Subjective: Patient states her knee has been bothering her over the weekend. Falls since last visit NO(if yes complete the Fall Tracking Form and include bsrifallreport):  Caregiver involvement changes: None  Home health supplies by type and quantity ordered/delivered this visit include: None    Clinician asked if patient has had any physician contact since last home care visit and patient states: NO  Clinician asked if patient has any new or changed medications and patient states:  NO   If Yes, were medications reconciled? N/A   Was the certifying physician notified of changes in medications? N/A     Clinical assessment (what this visit means for the patient overall and need for ongoing skilled care) and progress or lack of progress towards SPECIFIC goals: Patient is improving with independence during gait but continues to be a high fall risk. Gait goal not met. Patient requires education about exercise. Exercise goal not met. Written Teaching Material Utilized: N/A    Interdisciplinary communication with: N/A    Discharge planning as follows:  Will discharge when the patient has reached their maximum functional potential and maximum safety in their home    Specific plan for next visit: continue with gait and exercise instruction

## 2023-04-27 ENCOUNTER — HOME CARE VISIT (OUTPATIENT)
Dept: SCHEDULING | Facility: HOME HEALTH | Age: 88
End: 2023-04-27
Payer: MEDICARE

## 2023-04-27 VITALS
HEART RATE: 68 BPM | SYSTOLIC BLOOD PRESSURE: 130 MMHG | OXYGEN SATURATION: 97 % | TEMPERATURE: 98 F | RESPIRATION RATE: 18 BRPM | DIASTOLIC BLOOD PRESSURE: 78 MMHG

## 2023-04-27 PROCEDURE — G0151 HHCP-SERV OF PT,EA 15 MIN: HCPCS

## 2023-04-27 NOTE — HOME HEALTH
Subjective: Patient states she is having a tough time today and did not sleep much last night. Falls since last visit NO(if yes complete the Fall Tracking Form and include bsrifallreport):  Caregiver involvement changes: None  Home health supplies by type and quantity ordered/delivered this visit include: None    Clinician asked if patient has had any physician contact since last home care visit and patient states: NO  Clinician asked if patient has any new or changed medications and patient states:  NO   If Yes, were medications reconciled? N/A   Was the certifying physician notified of changes in medications? N/A     Clinical assessment (what this visit means for the patient overall and need for ongoing skilled care) and progress or lack of progress towards SPECIFIC goals: Patient is improving with independent ambulation but continues to require assistance d/t knee pain and instability. Gait goal not met. Patient requires assistance with med management. Discussed idea of having pharmacy mail service for med. Continue to monitor independence with med management. Written Teaching Material Utilized: N/A    Interdisciplinary communication with: N/A    Discharge planning as follows:  Will discharge when the patient has reached their maximum functional potential and maximum safety in their home    Specific plan for next visit: gait and strengthening

## 2023-05-02 ENCOUNTER — HOME CARE VISIT (OUTPATIENT)
Dept: SCHEDULING | Facility: HOME HEALTH | Age: 88
End: 2023-05-02
Payer: MEDICARE

## 2023-05-02 VITALS
TEMPERATURE: 98.2 F | OXYGEN SATURATION: 99 % | HEART RATE: 65 BPM | DIASTOLIC BLOOD PRESSURE: 78 MMHG | RESPIRATION RATE: 16 BRPM | SYSTOLIC BLOOD PRESSURE: 130 MMHG

## 2023-05-02 PROCEDURE — G0151 HHCP-SERV OF PT,EA 15 MIN: HCPCS

## 2023-05-04 ENCOUNTER — HOME CARE VISIT (OUTPATIENT)
Dept: SCHEDULING | Facility: HOME HEALTH | Age: 88
End: 2023-05-04
Payer: MEDICARE

## 2023-05-04 VITALS
DIASTOLIC BLOOD PRESSURE: 80 MMHG | TEMPERATURE: 98.3 F | OXYGEN SATURATION: 99 % | HEART RATE: 70 BPM | SYSTOLIC BLOOD PRESSURE: 124 MMHG | RESPIRATION RATE: 18 BRPM

## 2023-05-04 PROCEDURE — G0151 HHCP-SERV OF PT,EA 15 MIN: HCPCS

## 2023-05-09 ENCOUNTER — HOME CARE VISIT (OUTPATIENT)
Dept: SCHEDULING | Facility: HOME HEALTH | Age: 88
End: 2023-05-09
Payer: MEDICARE

## 2023-05-09 VITALS
HEART RATE: 64 BPM | DIASTOLIC BLOOD PRESSURE: 80 MMHG | OXYGEN SATURATION: 96 % | SYSTOLIC BLOOD PRESSURE: 130 MMHG | RESPIRATION RATE: 16 BRPM | TEMPERATURE: 97.7 F

## 2023-05-09 PROCEDURE — G0151 HHCP-SERV OF PT,EA 15 MIN: HCPCS

## 2023-05-11 ENCOUNTER — HOME CARE VISIT (OUTPATIENT)
Dept: SCHEDULING | Facility: HOME HEALTH | Age: 88
End: 2023-05-11
Payer: MEDICARE

## 2023-05-11 VITALS
SYSTOLIC BLOOD PRESSURE: 139 MMHG | DIASTOLIC BLOOD PRESSURE: 80 MMHG | OXYGEN SATURATION: 99 % | HEART RATE: 63 BPM | TEMPERATURE: 98.1 F | RESPIRATION RATE: 16 BRPM

## 2023-05-11 PROCEDURE — G0151 HHCP-SERV OF PT,EA 15 MIN: HCPCS

## 2023-05-16 ENCOUNTER — HOME CARE VISIT (OUTPATIENT)
Dept: SCHEDULING | Facility: HOME HEALTH | Age: 88
End: 2023-05-16
Payer: MEDICARE

## 2023-05-16 PROCEDURE — G0151 HHCP-SERV OF PT,EA 15 MIN: HCPCS

## 2023-05-17 VITALS
TEMPERATURE: 97.9 F | SYSTOLIC BLOOD PRESSURE: 122 MMHG | OXYGEN SATURATION: 100 % | HEART RATE: 77 BPM | DIASTOLIC BLOOD PRESSURE: 72 MMHG | RESPIRATION RATE: 16 BRPM

## 2023-05-18 ENCOUNTER — HOME CARE VISIT (OUTPATIENT)
Dept: SCHEDULING | Facility: HOME HEALTH | Age: 88
End: 2023-05-18
Payer: MEDICARE

## 2023-05-18 VITALS
SYSTOLIC BLOOD PRESSURE: 115 MMHG | TEMPERATURE: 98.2 F | HEART RATE: 68 BPM | DIASTOLIC BLOOD PRESSURE: 68 MMHG | RESPIRATION RATE: 18 BRPM | OXYGEN SATURATION: 98 %

## 2023-05-18 PROCEDURE — G0151 HHCP-SERV OF PT,EA 15 MIN: HCPCS

## 2023-05-23 ENCOUNTER — HOME CARE VISIT (OUTPATIENT)
Dept: SCHEDULING | Facility: HOME HEALTH | Age: 88
End: 2023-05-23
Payer: MEDICARE

## 2023-05-23 PROCEDURE — G0151 HHCP-SERV OF PT,EA 15 MIN: HCPCS

## 2023-05-24 VITALS
RESPIRATION RATE: 18 BRPM | OXYGEN SATURATION: 100 % | TEMPERATURE: 98.3 F | DIASTOLIC BLOOD PRESSURE: 62 MMHG | SYSTOLIC BLOOD PRESSURE: 130 MMHG | HEART RATE: 63 BPM

## 2023-05-25 ENCOUNTER — HOME CARE VISIT (OUTPATIENT)
Dept: SCHEDULING | Facility: HOME HEALTH | Age: 88
End: 2023-05-25
Payer: MEDICARE

## 2023-05-25 VITALS
DIASTOLIC BLOOD PRESSURE: 72 MMHG | HEART RATE: 110 BPM | TEMPERATURE: 97.7 F | OXYGEN SATURATION: 97 % | SYSTOLIC BLOOD PRESSURE: 124 MMHG | RESPIRATION RATE: 16 BRPM

## 2023-05-25 PROCEDURE — G0151 HHCP-SERV OF PT,EA 15 MIN: HCPCS

## 2023-05-30 ENCOUNTER — HOME CARE VISIT (OUTPATIENT)
Dept: SCHEDULING | Facility: HOME HEALTH | Age: 88
End: 2023-05-30
Payer: MEDICARE

## 2023-05-30 VITALS
SYSTOLIC BLOOD PRESSURE: 131 MMHG | TEMPERATURE: 98.1 F | OXYGEN SATURATION: 96 % | RESPIRATION RATE: 18 BRPM | HEART RATE: 106 BPM | DIASTOLIC BLOOD PRESSURE: 98 MMHG

## 2023-05-30 PROCEDURE — G0151 HHCP-SERV OF PT,EA 15 MIN: HCPCS

## 2023-06-01 ENCOUNTER — HOME CARE VISIT (OUTPATIENT)
Dept: SCHEDULING | Facility: HOME HEALTH | Age: 88
End: 2023-06-01
Payer: MEDICARE

## 2023-06-01 VITALS
OXYGEN SATURATION: 97 % | HEART RATE: 68 BPM | TEMPERATURE: 98.1 F | SYSTOLIC BLOOD PRESSURE: 121 MMHG | RESPIRATION RATE: 16 BRPM | DIASTOLIC BLOOD PRESSURE: 80 MMHG

## 2023-06-01 PROCEDURE — G0151 HHCP-SERV OF PT,EA 15 MIN: HCPCS

## 2023-06-06 ENCOUNTER — HOME CARE VISIT (OUTPATIENT)
Dept: SCHEDULING | Facility: HOME HEALTH | Age: 88
End: 2023-06-06
Payer: MEDICARE

## 2023-06-06 PROCEDURE — G0151 HHCP-SERV OF PT,EA 15 MIN: HCPCS

## 2023-06-07 VITALS
RESPIRATION RATE: 18 BRPM | SYSTOLIC BLOOD PRESSURE: 121 MMHG | OXYGEN SATURATION: 98 % | TEMPERATURE: 98 F | HEART RATE: 63 BPM | DIASTOLIC BLOOD PRESSURE: 81 MMHG

## 2023-06-09 ENCOUNTER — HOME CARE VISIT (OUTPATIENT)
Dept: SCHEDULING | Facility: HOME HEALTH | Age: 88
End: 2023-06-09
Payer: MEDICARE

## 2023-06-09 PROCEDURE — G0151 HHCP-SERV OF PT,EA 15 MIN: HCPCS

## 2023-06-11 VITALS
RESPIRATION RATE: 18 BRPM | SYSTOLIC BLOOD PRESSURE: 102 MMHG | OXYGEN SATURATION: 100 % | DIASTOLIC BLOOD PRESSURE: 68 MMHG | TEMPERATURE: 97.5 F | HEART RATE: 62 BPM

## 2023-07-08 ENCOUNTER — HOSPITAL ENCOUNTER (EMERGENCY)
Facility: HOSPITAL | Age: 88
Discharge: HOME | End: 2023-07-08
Attending: EMERGENCY MEDICINE
Payer: MEDICARE

## 2023-07-08 VITALS
SYSTOLIC BLOOD PRESSURE: 141 MMHG | HEIGHT: 68 IN | OXYGEN SATURATION: 94 % | WEIGHT: 145.5 LBS | RESPIRATION RATE: 18 BRPM | HEART RATE: 81 BPM | DIASTOLIC BLOOD PRESSURE: 81 MMHG | TEMPERATURE: 97.8 F | BODY MASS INDEX: 22.05 KG/M2

## 2023-07-08 DIAGNOSIS — R30.0 DYSURIA: Primary | ICD-10-CM

## 2023-07-08 DIAGNOSIS — T78.40XA ALLERGIC REACTION, INITIAL ENCOUNTER: ICD-10-CM

## 2023-07-08 LAB
ALBUMIN SERPL-MCNC: 3.7 G/DL (ref 3.5–5)
ALBUMIN/GLOB SERPL: 0.9 (ref 1.1–2.2)
ALP SERPL-CCNC: 111 U/L (ref 45–117)
ALT SERPL-CCNC: 21 U/L (ref 12–78)
ANION GAP SERPL CALC-SCNC: 11 MMOL/L (ref 5–15)
APPEARANCE UR: CLEAR
AST SERPL-CCNC: 22 U/L (ref 15–37)
BACTERIA URNS QL MICRO: NEGATIVE /HPF
BASOPHILS # BLD: 0 K/UL (ref 0–0.1)
BASOPHILS NFR BLD: 0 % (ref 0–1)
BILIRUB SERPL-MCNC: 0.4 MG/DL (ref 0.2–1)
BILIRUB UR QL: NEGATIVE
BUN SERPL-MCNC: 19 MG/DL (ref 6–20)
BUN/CREAT SERPL: 27 (ref 12–20)
CALCIUM SERPL-MCNC: 9.2 MG/DL (ref 8.5–10.1)
CHLORIDE SERPL-SCNC: 103 MMOL/L (ref 97–108)
CLUE CELLS VAG QL WET PREP: NORMAL
CO2 SERPL-SCNC: 25 MMOL/L (ref 21–32)
COLOR UR: ABNORMAL
CREAT SERPL-MCNC: 0.7 MG/DL (ref 0.55–1.02)
DIFFERENTIAL METHOD BLD: ABNORMAL
EOSINOPHIL # BLD: 0 K/UL (ref 0–0.4)
EOSINOPHIL NFR BLD: 1 % (ref 0–7)
EPITH CASTS URNS QL MICRO: ABNORMAL /LPF
ERYTHROCYTE [DISTWIDTH] IN BLOOD BY AUTOMATED COUNT: 12.9 % (ref 11.5–14.5)
GLOBULIN SER CALC-MCNC: 4.1 G/DL (ref 2–4)
GLUCOSE SERPL-MCNC: 121 MG/DL (ref 65–100)
GLUCOSE UR STRIP.AUTO-MCNC: NEGATIVE MG/DL
HCT VFR BLD AUTO: 43 % (ref 35–47)
HGB BLD-MCNC: 14 G/DL (ref 11.5–16)
HGB UR QL STRIP: ABNORMAL
IMM GRANULOCYTES # BLD AUTO: 0.1 K/UL (ref 0–0.04)
IMM GRANULOCYTES NFR BLD AUTO: 1 % (ref 0–0.5)
KETONES UR QL STRIP.AUTO: NEGATIVE MG/DL
KOH PREP SPEC: NORMAL
LEUKOCYTE ESTERASE UR QL STRIP.AUTO: NEGATIVE
LYMPHOCYTES # BLD: 1.1 K/UL (ref 0.8–3.5)
LYMPHOCYTES NFR BLD: 13 % (ref 12–49)
MAGNESIUM SERPL-MCNC: 1.9 MG/DL (ref 1.6–2.4)
MCH RBC QN AUTO: 32 PG (ref 26–34)
MCHC RBC AUTO-ENTMCNC: 32.6 G/DL (ref 30–36.5)
MCV RBC AUTO: 98.2 FL (ref 80–99)
MONOCYTES # BLD: 0.5 K/UL (ref 0–1)
MONOCYTES NFR BLD: 6 % (ref 5–13)
NEUTS SEG # BLD: 6.9 K/UL (ref 1.8–8)
NEUTS SEG NFR BLD: 79 % (ref 32–75)
NITRITE UR QL STRIP.AUTO: NEGATIVE
NRBC # BLD: 0 K/UL (ref 0–0.01)
NRBC BLD-RTO: 0 PER 100 WBC
PH UR STRIP: 6 (ref 5–8)
PLATELET # BLD AUTO: 180 K/UL (ref 150–400)
PMV BLD AUTO: 9.6 FL (ref 8.9–12.9)
POTASSIUM SERPL-SCNC: 3.9 MMOL/L (ref 3.5–5.1)
PROT SERPL-MCNC: 7.8 G/DL (ref 6.4–8.2)
PROT UR STRIP-MCNC: NEGATIVE MG/DL
RBC # BLD AUTO: 4.38 M/UL (ref 3.8–5.2)
RBC #/AREA URNS HPF: ABNORMAL /HPF (ref 0–5)
SERVICE CMNT-IMP: NORMAL
SODIUM SERPL-SCNC: 139 MMOL/L (ref 136–145)
SP GR UR REFRACTOMETRY: 1.01 (ref 1–1.03)
SPECIMEN HOLD: NORMAL
T VAGINALIS VAG QL WET PREP: NORMAL
UROBILINOGEN UR QL STRIP.AUTO: 0.2 EU/DL (ref 0.2–1)
WBC # BLD AUTO: 8.6 K/UL (ref 3.6–11)
WBC URNS QL MICRO: ABNORMAL /HPF (ref 0–4)

## 2023-07-08 PROCEDURE — 99283 EMERGENCY DEPT VISIT LOW MDM: CPT

## 2023-07-08 PROCEDURE — 83735 ASSAY OF MAGNESIUM: CPT

## 2023-07-08 PROCEDURE — 81001 URINALYSIS AUTO W/SCOPE: CPT

## 2023-07-08 PROCEDURE — 80053 COMPREHEN METABOLIC PANEL: CPT

## 2023-07-08 PROCEDURE — 85025 COMPLETE CBC W/AUTO DIFF WBC: CPT

## 2023-07-08 PROCEDURE — 87210 SMEAR WET MOUNT SALINE/INK: CPT

## 2023-07-08 PROCEDURE — 6370000000 HC RX 637 (ALT 250 FOR IP): Performed by: EMERGENCY MEDICINE

## 2023-07-08 PROCEDURE — 36415 COLL VENOUS BLD VENIPUNCTURE: CPT

## 2023-07-08 RX ORDER — CETIRIZINE HYDROCHLORIDE 10 MG/1
10 TABLET ORAL ONCE
Status: COMPLETED | OUTPATIENT
Start: 2023-07-08 | End: 2023-07-08

## 2023-07-08 RX ADMIN — CETIRIZINE HYDROCHLORIDE 10 MG: 10 TABLET, FILM COATED ORAL at 01:10

## 2023-07-08 ASSESSMENT — ENCOUNTER SYMPTOMS
NAUSEA: 0
FACIAL SWELLING: 0
ABDOMINAL PAIN: 0
SHORTNESS OF BREATH: 0
VOMITING: 0
DIARRHEA: 0

## 2023-07-08 ASSESSMENT — PAIN - FUNCTIONAL ASSESSMENT: PAIN_FUNCTIONAL_ASSESSMENT: NONE - DENIES PAIN

## 2023-07-08 NOTE — ED TRIAGE NOTES
[facility] Nursing Note        Patient Name: David Patel      : 1935             MRN: [id number]      Chief Complaint:  Allergic Reaction      Admit Diagnosis: No admission diagnoses are documented for this encounter. Admitting Provider: No admitting provider for patient encounter. Surgery: * No surgery found *           Patient arrived to the ER via EMS from an [address] [place] [address] with complaints of an Allergic Reaction (skin redness in arms & legs) when she started taking Macrobid from a year ago after self diagnosing herself with a UTI. Pt states she attempted to call an MD without success. Pt states a Hx of A-Fib on ASA, and a Hx of a Stroke.          Lines:        Signed by: Juancho Bojorquez RN, LIZBET, BSN, VIA [facility]                                              2023 at 12:51 AM